# Patient Record
Sex: FEMALE | Race: WHITE | Employment: OTHER | URBAN - METROPOLITAN AREA
[De-identification: names, ages, dates, MRNs, and addresses within clinical notes are randomized per-mention and may not be internally consistent; named-entity substitution may affect disease eponyms.]

---

## 2018-08-06 ENCOUNTER — APPOINTMENT (OUTPATIENT)
Dept: MRI IMAGING | Age: 78
End: 2018-08-06
Attending: NURSE PRACTITIONER
Payer: MEDICARE

## 2018-08-06 ENCOUNTER — APPOINTMENT (OUTPATIENT)
Dept: GENERAL RADIOLOGY | Age: 78
End: 2018-08-06
Attending: NURSE PRACTITIONER
Payer: MEDICARE

## 2018-08-06 ENCOUNTER — HOSPITAL ENCOUNTER (OUTPATIENT)
Age: 78
Setting detail: OBSERVATION
Discharge: NURSING, REHAB OR LONG-TERM CARE | End: 2018-08-09
Attending: EMERGENCY MEDICINE | Admitting: HOSPITALIST
Payer: MEDICARE

## 2018-08-06 ENCOUNTER — APPOINTMENT (OUTPATIENT)
Dept: CT IMAGING | Age: 78
End: 2018-08-06
Attending: NURSE PRACTITIONER
Payer: MEDICARE

## 2018-08-06 DIAGNOSIS — G45.9 TRANSIENT CEREBRAL ISCHEMIA, UNSPECIFIED TYPE: Primary | ICD-10-CM

## 2018-08-06 DIAGNOSIS — L08.9 ANIMAL BITE OF RIGHT HAND WITH INFECTION, INITIAL ENCOUNTER: ICD-10-CM

## 2018-08-06 DIAGNOSIS — S61.451A ANIMAL BITE OF RIGHT HAND WITH INFECTION, INITIAL ENCOUNTER: ICD-10-CM

## 2018-08-06 DIAGNOSIS — R53.1 WEAKNESS: ICD-10-CM

## 2018-08-06 DIAGNOSIS — R19.7 DIARRHEA, UNSPECIFIED TYPE: ICD-10-CM

## 2018-08-06 PROBLEM — R47.9 SPEECH ABNORMALITY: Status: ACTIVE | Noted: 2018-08-06

## 2018-08-06 PROBLEM — R47.1 DYSARTHRIA: Status: ACTIVE | Noted: 2018-08-06
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
## 2018-08-06 PROBLEM — E11.9 DM (DIABETES MELLITUS) (HCC): Status: ACTIVE | Noted: 2018-08-06

## 2018-08-06 PROBLEM — E16.2 HYPOGLYCEMIA: Status: ACTIVE | Noted: 2018-08-06

## 2018-08-06 LAB
AMORPH CRY URNS QL MICRO: ABNORMAL
ANION GAP SERPL CALC-SCNC: 9 MMOL/L (ref 3–18)
APPEARANCE UR: CLEAR
BACTERIA URNS QL MICRO: ABNORMAL /HPF
BASOPHILS # BLD: 0 K/UL (ref 0–0.1)
BASOPHILS NFR BLD: 0 % (ref 0–2)
BILIRUB UR QL: NEGATIVE
BUN SERPL-MCNC: 11 MG/DL (ref 7–18)
BUN/CREAT SERPL: 13 (ref 12–20)
CALCIUM SERPL-MCNC: 8.8 MG/DL (ref 8.5–10.1)
CHLORIDE SERPL-SCNC: 101 MMOL/L (ref 100–108)
CHOLEST SERPL-MCNC: 115 MG/DL
CK MB CFR SERPL CALC: 1.4 % (ref 0–4)
CK MB SERPL-MCNC: 3.8 NG/ML (ref 5–25)
CK SERPL-CCNC: 275 U/L (ref 26–192)
CO2 SERPL-SCNC: 30 MMOL/L (ref 21–32)
COLOR UR: YELLOW
CREAT SERPL-MCNC: 0.88 MG/DL (ref 0.6–1.3)
DIFFERENTIAL METHOD BLD: ABNORMAL
EOSINOPHIL # BLD: 0 K/UL (ref 0–0.4)
EOSINOPHIL NFR BLD: 1 % (ref 0–5)
EPITH CASTS URNS QL MICRO: ABNORMAL /LPF (ref 0–5)
ERYTHROCYTE [DISTWIDTH] IN BLOOD BY AUTOMATED COUNT: 13.3 % (ref 11.6–14.5)
ERYTHROCYTE [SEDIMENTATION RATE] IN BLOOD: 29 MM/HR (ref 0–30)
EST. AVERAGE GLUCOSE BLD GHB EST-MCNC: 160 MG/DL
GLUCOSE BLD STRIP.AUTO-MCNC: 119 MG/DL (ref 70–110)
GLUCOSE BLD STRIP.AUTO-MCNC: 133 MG/DL (ref 70–110)
GLUCOSE BLD STRIP.AUTO-MCNC: 146 MG/DL (ref 70–110)
GLUCOSE BLD STRIP.AUTO-MCNC: 56 MG/DL (ref 70–110)
GLUCOSE BLD STRIP.AUTO-MCNC: 90 MG/DL (ref 70–110)
GLUCOSE SERPL-MCNC: 34 MG/DL (ref 74–99)
GLUCOSE UR STRIP.AUTO-MCNC: 500 MG/DL
HBA1C MFR BLD: 7.2 % (ref 4.2–5.6)
HCT VFR BLD AUTO: 38.6 % (ref 35–45)
HDLC SERPL-MCNC: 22 MG/DL (ref 40–60)
HDLC SERPL: 5.2 {RATIO} (ref 0–5)
HGB BLD-MCNC: 13.4 G/DL (ref 12–16)
HGB UR QL STRIP: ABNORMAL
KETONES UR QL STRIP.AUTO: NEGATIVE MG/DL
LDLC SERPL CALC-MCNC: 55.6 MG/DL (ref 0–100)
LEUKOCYTE ESTERASE UR QL STRIP.AUTO: NEGATIVE
LIPID PROFILE,FLP: ABNORMAL
LYMPHOCYTES # BLD: 0.2 K/UL (ref 0.9–3.6)
LYMPHOCYTES NFR BLD: 5 % (ref 21–52)
MCH RBC QN AUTO: 33.1 PG (ref 24–34)
MCHC RBC AUTO-ENTMCNC: 34.7 G/DL (ref 31–37)
MCV RBC AUTO: 95.3 FL (ref 74–97)
MONOCYTES # BLD: 0.2 K/UL (ref 0.05–1.2)
MONOCYTES NFR BLD: 5 % (ref 3–10)
NEUTS SEG # BLD: 4.2 K/UL (ref 1.8–8)
NEUTS SEG NFR BLD: 89 % (ref 40–73)
NITRITE UR QL STRIP.AUTO: NEGATIVE
PH UR STRIP: 6.5 [PH] (ref 5–8)
PLATELET # BLD AUTO: 248 K/UL (ref 135–420)
PMV BLD AUTO: 10.7 FL (ref 9.2–11.8)
POTASSIUM SERPL-SCNC: 3.2 MMOL/L (ref 3.5–5.5)
PROT UR STRIP-MCNC: 30 MG/DL
RBC # BLD AUTO: 4.05 M/UL (ref 4.2–5.3)
RBC #/AREA URNS HPF: ABNORMAL /HPF (ref 0–5)
SODIUM SERPL-SCNC: 140 MMOL/L (ref 136–145)
SP GR UR REFRACTOMETRY: 1.02 (ref 1–1.03)
T3FREE SERPL-MCNC: 1.9 PG/ML (ref 2.18–3.98)
T4 FREE SERPL-MCNC: 1.3 NG/DL (ref 0.7–1.5)
TRIGL SERPL-MCNC: 187 MG/DL (ref ?–150)
TROPONIN I SERPL-MCNC: 0.04 NG/ML (ref 0–0.04)
TSH SERPL DL<=0.05 MIU/L-ACNC: 0.87 UIU/ML (ref 0.36–3.74)
UROBILINOGEN UR QL STRIP.AUTO: 0.2 EU/DL (ref 0.2–1)
VLDLC SERPL CALC-MCNC: 37.4 MG/DL
WBC # BLD AUTO: 4.6 K/UL (ref 4.6–13.2)
WBC URNS QL MICRO: ABNORMAL /HPF (ref 0–4)

## 2018-08-06 PROCEDURE — 77030037870 HC GLD SHT PREVALON SAGE -B

## 2018-08-06 PROCEDURE — 99285 EMERGENCY DEPT VISIT HI MDM: CPT

## 2018-08-06 PROCEDURE — 96365 THER/PROPH/DIAG IV INF INIT: CPT

## 2018-08-06 PROCEDURE — 77030032490 HC SLV COMPR SCD KNE COVD -B

## 2018-08-06 PROCEDURE — 74011250637 HC RX REV CODE- 250/637: Performed by: NURSE PRACTITIONER

## 2018-08-06 PROCEDURE — 71045 X-RAY EXAM CHEST 1 VIEW: CPT

## 2018-08-06 PROCEDURE — 74011000258 HC RX REV CODE- 258: Performed by: EMERGENCY MEDICINE

## 2018-08-06 PROCEDURE — 85025 COMPLETE CBC W/AUTO DIFF WBC: CPT | Performed by: NURSE PRACTITIONER

## 2018-08-06 PROCEDURE — 65390000012 HC CONDITION CODE 44 OBSERVATION

## 2018-08-06 PROCEDURE — 85652 RBC SED RATE AUTOMATED: CPT | Performed by: NURSE PRACTITIONER

## 2018-08-06 PROCEDURE — 82550 ASSAY OF CK (CPK): CPT | Performed by: EMERGENCY MEDICINE

## 2018-08-06 PROCEDURE — 74011000258 HC RX REV CODE- 258: Performed by: NURSE PRACTITIONER

## 2018-08-06 PROCEDURE — 82962 GLUCOSE BLOOD TEST: CPT

## 2018-08-06 PROCEDURE — 96375 TX/PRO/DX INJ NEW DRUG ADDON: CPT

## 2018-08-06 PROCEDURE — 74011250637 HC RX REV CODE- 250/637: Performed by: EMERGENCY MEDICINE

## 2018-08-06 PROCEDURE — 80061 LIPID PANEL: CPT | Performed by: NURSE PRACTITIONER

## 2018-08-06 PROCEDURE — 84443 ASSAY THYROID STIM HORMONE: CPT | Performed by: NURSE PRACTITIONER

## 2018-08-06 PROCEDURE — 96366 THER/PROPH/DIAG IV INF ADDON: CPT

## 2018-08-06 PROCEDURE — 84481 FREE ASSAY (FT-3): CPT | Performed by: NURSE PRACTITIONER

## 2018-08-06 PROCEDURE — 84439 ASSAY OF FREE THYROXINE: CPT | Performed by: NURSE PRACTITIONER

## 2018-08-06 PROCEDURE — 87040 BLOOD CULTURE FOR BACTERIA: CPT | Performed by: EMERGENCY MEDICINE

## 2018-08-06 PROCEDURE — 74011250636 HC RX REV CODE- 250/636: Performed by: NURSE PRACTITIONER

## 2018-08-06 PROCEDURE — 77030037878 HC DRSG MEPILEX >48IN BORD MOLN -B

## 2018-08-06 PROCEDURE — 73130 X-RAY EXAM OF HAND: CPT

## 2018-08-06 PROCEDURE — 74011250636 HC RX REV CODE- 250/636: Performed by: EMERGENCY MEDICINE

## 2018-08-06 PROCEDURE — 94761 N-INVAS EAR/PLS OXIMETRY MLT: CPT

## 2018-08-06 PROCEDURE — 86141 C-REACTIVE PROTEIN HS: CPT | Performed by: NURSE PRACTITIONER

## 2018-08-06 PROCEDURE — 65270000029 HC RM PRIVATE

## 2018-08-06 PROCEDURE — 83036 HEMOGLOBIN GLYCOSYLATED A1C: CPT | Performed by: NURSE PRACTITIONER

## 2018-08-06 PROCEDURE — 80048 BASIC METABOLIC PNL TOTAL CA: CPT | Performed by: EMERGENCY MEDICINE

## 2018-08-06 PROCEDURE — 70450 CT HEAD/BRAIN W/O DYE: CPT

## 2018-08-06 PROCEDURE — 77030038269 HC DRN EXT URIN PURWCK BARD -A

## 2018-08-06 PROCEDURE — 74011000250 HC RX REV CODE- 250: Performed by: EMERGENCY MEDICINE

## 2018-08-06 PROCEDURE — 93005 ELECTROCARDIOGRAM TRACING: CPT

## 2018-08-06 PROCEDURE — 81001 URINALYSIS AUTO W/SCOPE: CPT | Performed by: NURSE PRACTITIONER

## 2018-08-06 PROCEDURE — 74011000250 HC RX REV CODE- 250: Performed by: NURSE PRACTITIONER

## 2018-08-06 RX ORDER — ALBUTEROL SULFATE 0.83 MG/ML
2.5 SOLUTION RESPIRATORY (INHALATION)
Status: DISCONTINUED | OUTPATIENT
Start: 2018-08-06 | End: 2018-08-09 | Stop reason: HOSPADM

## 2018-08-06 RX ORDER — MAGNESIUM SULFATE 100 %
4 CRYSTALS MISCELLANEOUS AS NEEDED
Status: DISCONTINUED | OUTPATIENT
Start: 2018-08-06 | End: 2018-08-09 | Stop reason: HOSPADM

## 2018-08-06 RX ORDER — AMOXICILLIN 250 MG
2 CAPSULE ORAL
Status: DISCONTINUED | OUTPATIENT
Start: 2018-08-06 | End: 2018-08-09 | Stop reason: HOSPADM

## 2018-08-06 RX ORDER — DEXTROSE 50 % IN WATER (D50W) INTRAVENOUS SYRINGE
25-50 AS NEEDED
Status: DISCONTINUED | OUTPATIENT
Start: 2018-08-06 | End: 2018-08-09 | Stop reason: HOSPADM

## 2018-08-06 RX ORDER — DEXTROSE 50 % IN WATER (D50W) INTRAVENOUS SYRINGE
25
Status: COMPLETED | OUTPATIENT
Start: 2018-08-06 | End: 2018-08-06

## 2018-08-06 RX ORDER — ATORVASTATIN CALCIUM 40 MG/1
80 TABLET, FILM COATED ORAL
Status: DISCONTINUED | OUTPATIENT
Start: 2018-08-06 | End: 2018-08-09 | Stop reason: HOSPADM

## 2018-08-06 RX ORDER — INSULIN LISPRO 100 [IU]/ML
INJECTION, SOLUTION INTRAVENOUS; SUBCUTANEOUS
Status: DISCONTINUED | OUTPATIENT
Start: 2018-08-06 | End: 2018-08-09 | Stop reason: HOSPADM

## 2018-08-06 RX ORDER — ACETAMINOPHEN 325 MG/1
650 TABLET ORAL
Status: DISCONTINUED | OUTPATIENT
Start: 2018-08-06 | End: 2018-08-09 | Stop reason: HOSPADM

## 2018-08-06 RX ORDER — ASPIRIN 325 MG
325 TABLET ORAL DAILY
Status: DISCONTINUED | OUTPATIENT
Start: 2018-08-07 | End: 2018-08-09 | Stop reason: HOSPADM

## 2018-08-06 RX ORDER — ASPIRIN 300 MG/1
300 SUPPOSITORY RECTAL
Status: COMPLETED | OUTPATIENT
Start: 2018-08-06 | End: 2018-08-06

## 2018-08-06 RX ORDER — ONDANSETRON 2 MG/ML
2 INJECTION INTRAMUSCULAR; INTRAVENOUS
Status: DISCONTINUED | OUTPATIENT
Start: 2018-08-06 | End: 2018-08-07

## 2018-08-06 RX ORDER — ACETAMINOPHEN 650 MG/1
650 SUPPOSITORY RECTAL
Status: DISCONTINUED | OUTPATIENT
Start: 2018-08-06 | End: 2018-08-09 | Stop reason: HOSPADM

## 2018-08-06 RX ADMIN — DEXTROSE MONOHYDRATE 25 G: 25 INJECTION, SOLUTION INTRAVENOUS at 11:42

## 2018-08-06 RX ADMIN — ASPIRIN 300 MG: 300 SUPPOSITORY RECTAL at 11:58

## 2018-08-06 RX ADMIN — PIPERACILLIN SODIUM,TAZOBACTAM SODIUM 4.5 G: 4; .5 INJECTION, POWDER, FOR SOLUTION INTRAVENOUS at 12:22

## 2018-08-06 RX ADMIN — PIPERACILLIN SODIUM,TAZOBACTAM SODIUM 4.5 G: 4; .5 INJECTION, POWDER, FOR SOLUTION INTRAVENOUS at 16:57

## 2018-08-06 RX ADMIN — FOLIC ACID: 5 INJECTION, SOLUTION INTRAMUSCULAR; INTRAVENOUS; SUBCUTANEOUS at 22:18

## 2018-08-06 RX ADMIN — ONDANSETRON 2 MG: 2 INJECTION, SOLUTION INTRAMUSCULAR; INTRAVENOUS at 20:23

## 2018-08-06 RX ADMIN — ATORVASTATIN CALCIUM 80 MG: 40 TABLET, FILM COATED ORAL at 16:57

## 2018-08-06 RX ADMIN — Medication 16 G: at 16:57

## 2018-08-06 NOTE — IP AVS SNAPSHOT
[facility], [place], operated by [facility] 
 
 
 [address] [address] [facility] Patient: Mathieu Brown MRN: [id number] REW:2[birth year]90[birth year]137 About your hospitalization You were admitted on:  August 6, 2018 You last received care in the:  [address] [place] [address] Drive 2E GEN SURG You were discharged on:  August 9, 2018 Why you were hospitalized Your primary diagnosis was:  Tia (Transient Ischemic Attack) Your diagnoses also included:  Speech Abnormality, Dysarthria, Dog Bite, Dm (Diabetes Mellitus) (Hcc), Hypoglycemia Follow-up Information Follow up With Details Comments Contact Info Citlalli Gaines MD In 1 week Recent hypoglycemia/CVA like symptoms [address] 83 [id number] [phone] Jennifer Summers MD In 1 week Regarding MRA neck showing moderate to severe carotid stenosis 655 [facility] 100 [address] [address] 
[phone] 8836 [address]   [address] [address] 
[phone] Discharge Orders None A check brody indicates which time of day the medication should be taken. My Medications START taking these medications Instructions Each Dose to Equal  
 Morning Noon Evening Bedtime  
 amoxicillin-clavulanate 1,000-62.5 mg per tablet Commonly known as:  AUGMENTIN Your last dose was: Your next dose is: Take 1 Tab by mouth every twelve (12) hours. Indications: dog bite 1 Tab CONTINUE taking these medications Instructions Each Dose to Equal  
 Morning Noon Evening Bedtime * albuterol 2.5 mg /3 mL (0.083 %) nebulizer solution Commonly known as:  PROVENTIL VENTOLIN Your last dose was: Your next dose is:    
   
   
 2.5 mg by Nebulization route every six (6) hours as needed for Wheezing. 2.5 mg  
    
   
   
   
  
 * albuterol 90 mcg/actuation inhaler Commonly known as:  PROVENTIL HFA, VENTOLIN HFA, PROAIR HFA Your last dose was: Your next dose is: Take 2 Puffs by inhalation every four (4) hours as needed for Wheezing. Indications: SOB 2 Puff  
    
   
   
   
  
 aspirin 325 mg tablet Commonly known as:  ASPIRIN Your last dose was: Your next dose is: Take 325 mg by mouth daily. Indications: MYOCARDIAL INFARCTION PREVENTION, PREVENTION OF TRANSIENT ISCHEMIC ATTACKS  
 325 mg  
    
   
   
   
  
 atorvastatin 20 mg tablet Commonly known as:  LIPITOR Your last dose was: Your next dose is: Take 20 mg by mouth daily. Indications: HYPERLIPIDEMIA, PREVENTION OF CEREBROVASCULAR ACCIDENT 20 mg  
    
   
   
   
  
 fluconazole 200 mg tablet Commonly known as:  DIFLUCAN Your last dose was: Your next dose is: Take 200 mg by mouth ACB/HS. 200 mg  
    
   
   
   
  
 gabapentin 300 mg capsule Commonly known as:  NEURONTIN Your last dose was: Your next dose is: Take 600 mg by mouth three (3) times daily. Indications: feet pain 600 mg  
    
   
   
   
  
 glimepiride 4 mg tablet Commonly known as:  AMARYL Your last dose was: Your next dose is: Take 4 mg by mouth ACB/HS. 4 mg  
    
   
   
   
  
 hydroCHLOROthiazide 25 mg tablet Commonly known as:  HYDRODIURIL Your last dose was: Your next dose is: Take 25 mg by mouth daily. Indications: EDEMA 25 mg HYDROcodone-acetaminophen 7.5-325 mg per tablet Commonly known as:  Augustus Sweeneyene Your last dose was: Your next dose is: Take 1 Tab by mouth every twelve (12) hours as needed for Pain. Indications: PAIN  
 1 Tab  
    
   
   
   
  
 ipratropium 0.02 % Soln Commonly known as:  ATROVENT Your last dose was: Your next dose is: 0.5 mg by Nebulization route every six (6) hours as needed for Wheezing. 0.5 mg  
    
   
   
   
  
 losartan 100 mg tablet Commonly known as:  COZAAR Your last dose was: Your next dose is: Take 100 mg by mouth daily. 100 mg  
    
   
   
   
  
 metFORMIN 500 mg tablet Commonly known as:  GLUCOPHAGE Your last dose was: Your next dose is: Take 500 mg by mouth two (2) times daily (with meals). Indications: TYPE 2 DIABETES MELLITUS  
 500 mg  
    
   
   
   
  
 traZODone 300 mg tablet Commonly known as:  Kyra Mcqueen Your last dose was: Your next dose is: Take 300 mg by mouth nightly. 300 mg * Notice: This list has 2 medication(s) that are the same as other medications prescribed for you. Read the directions carefully, and ask your doctor or other care provider to review them with you. Where to Get Your Medications Information on where to get these meds will be given to you by the nurse or doctor. ! Ask your nurse or doctor about these medications  
  amoxicillin-clavulanate 1,000-62.5 mg per tablet Opioid Education Prescription Opioids: What You Need to Know: 
 
 
 
F-face looks uneven A-arms unable to move or move unevenly S-speech slurred or non-existent T-time-call 911 as soon as signs and symptoms begin-DO NOT go Back to bed or wait to see if you get better-TIME IS BRAIN. Warning Signs of HEART ATTACK Call 911 if you have these symptoms: 
? Chest discomfort. Most heart attacks involve discomfort in the center of the chest that lasts more than a few minutes, or that goes away and comes back. It can feel like uncomfortable pressure, squeezing, fullness, or pain. ? Discomfort in other areas of the upper body. Symptoms can include pain or discomfort in one or both arms, the back, neck, jaw, or stomach. ? Shortness of breath with or without chest discomfort. ? Other signs may include breaking out in a cold sweat, nausea, or lightheadedness. Don't wait more than five minutes to call [address]! Fast action can save your life. Calling 911 is almost always the fastest way to get lifesaving treatment. Emergency Medical Services staff can begin treatment when they arrive  up to an hour sooner than if someone gets to the hospital by car. The discharge information has been reviewed with the patient. The patient verbalized understanding. Discharge medications reviewed with the patient and appropriate educational materials and side effects teaching were provided. ___________________________________________________________________________________________________________________________________ Transient Ischemic Attack: Care Instructions Your Care Instructions A transient ischemic attack (TIA) is when blood flow to a part of your brain is blocked for a short time. A TIA is like a stroke but usually lasts only a few minutes. A TIA does not cause lasting brain damage. Any vision problems, slurred speech, or other symptoms usually go away in 10 to 20 minutes. But they may last for up to 24 hours. TIAs are often warning signs of a stroke.  Some people who have a TIA may have a stroke in the future. A stroke can cause symptoms like those of a TIA. But a stroke causes lasting damage to your brain. You can take steps to help prevent a stroke. One thing you can do is get early treatment. If you have other new symptoms, or if your symptoms do not get better, go back to the emergency room or call your doctor right away. Getting treatment right away may prevent long-term brain damage caused by a stroke. The doctor has checked you carefully, but problems can develop later. If you notice any problems or new symptoms, get medical treatment right away. Follow-up care is a key part of your treatment and safety. Be sure to make and go to all appointments, and call your doctor if you are having problems. It's also a good idea to know your test results and keep a list of the medicines you take. How can you care for yourself at home? Medicines 
  · Be safe with medicines. Take your medicines exactly as prescribed. Call your doctor if you think you are having a problem with your medicine.  
  · If you take a blood thinner, such as aspirin, be sure you get instructions about how to take your medicine safely. Blood thinners can cause serious bleeding problems.  
  · Call your doctor if you are not able to take your medicines for any reason.  
  · Do not take any over-the-counter medicines or herbal products without talking to your doctor first.  
  · If you take birth control pills or hormone therapy, talk to your doctor. Ask if these treatments are right for you.  
 Lifestyle changes 
  · Do not smoke. If you need help quitting, talk to your doctor about stop-smoking programs and medicines.  
  · Be active. If your doctor recommends it, get more exercise. Walking is a good choice. Bit by bit, increase the amount you walk every day. Try for at least 30 minutes on most days of the week. You also may want to swim, bike, or do other activities.   · Eat heart-healthy foods. These include fruits, vegetables, high-fiber foods, fish, and foods that are low in sodium, saturated fat, and trans fat.  
  · Stay at a healthy weight. Lose weight if you need to.  
  · Limit alcohol to 2 drinks a day for men and 1 drink a day for women.  
 Staying healthy 
  · Manage other health problems such as diabetes, high blood pressure, and high cholesterol.  
  · Get the flu vaccine every year. When should you call for help? Call 911 anytime you think you may need emergency care. For example, call if: 
  · You have new or worse symptoms of a stroke. These may include: 
¨ Sudden numbness, tingling, weakness, or loss of movement in your face, arm, or leg, especially on only one side of your body. ¨ Sudden vision changes. ¨ Sudden trouble speaking. ¨ Sudden confusion or trouble understanding simple statements. ¨ Sudden problems with walking or balance. ¨ A sudden, severe headache that is different from past headaches. Call 911 even if these symptoms go away in a few minutes.  
  · You feel like you are having another TIA.  
 Watch closely for changes in your health, and be sure to contact your doctor if you have any problems. Where can you learn more? Go to http://abena-manju.info/. Enter ([phone] in the search box to learn more about \"Transient Ischemic Attack: Care Instructions. \" Current as of: November 21, 2017 Content Version: 11.7 © 6825-5117 [employer]. Care instructions adapted under license by [employer] (which disclaims liability or warranty for this information). If you have questions about a medical condition or this instruction, always ask your healthcare professional. [address] any warranty or liability for your use of this information. Patient discharged without removing armband and transfered to another healthcare acute, sub acute , or extended care facility.   Informed of privacy risks if armband lost or stolen [employer] Announcement We are excited to announce that we are making your provider's discharge notes available to you in [employer]. You will see these notes when they are completed and signed by the physician that discharged you from your recent hospital stay. If you have any questions or concerns about any information you see in [employer], please call the Health Information Department where you were seen or reach out to your Primary Care Provider for more information about your plan of care. Introducing [facility]! Jose Rafael Morris introduces [employer] patient portal. Now you can access parts of your medical record, email your doctor's office, and request medication refills online. 1. In your internet browser, go to [email or web address] 2. Click on the First Time User? Click Here link in the Sign In box. You will see the New Member Sign Up page. 3. Enter your [employer] Access Code exactly as it appears below. You will not need to use this code after youve completed the sign-up process. If you do not sign up before the expiration date, you must request a new code. · [employer] Access Code: [email or web address] Expires: 11/4/2018  8:31 AM 
 
4. Enter the last four digits of your Social Security Number (xxxx) and Date of Birth (mm/dd/yyyy) as indicated and click Submit. You will be taken to the next sign-up page. 5. Create a [employer] ID. This will be your [employer] login ID and cannot be changed, so think of one that is secure and easy to remember. 6. Create a [employer] password. You can change your password at any time. 7. Enter your Password Reset Question and Answer. This can be used at a later time if you forget your password. 8. Enter your e-mail address. You will receive e-mail notification when new information is available in [address] 9. Click Sign Up. You can now view and download portions of your medical record. 10. Click the Download Summary menu link to download a portable copy of your medical information. If you have questions, please visit the Frequently Asked Questions section of the [employer] website. Remember, [employer] is NOT to be used for urgent needs. For medical emergencies, dial 911. Now available from your iPhone and Android! Introducing Barry Mehta As a [facility] patient, I wanted to make you aware of our electronic visit tool called Barry Mehta. [employer]/[facility] allows you to connect within minutes with a medical provider 24 hours a day, seven days a week via a mobile device or tablet or logging into a secure website from your computer. You can access Barry Mehta from anywhere in the [place]. A virtual visit might be right for you when you have a simple condition and feel like you just dont want to get out of bed, or cant get away from work for an appointment, when your regular [facility] provider is not available (evenings, weekends or holidays), or when youre out of town and need minor care. Electronic visits cost only $49 and if the [employer]/[facility] provider determines a prescription is needed to treat your condition, one can be electronically transmitted to a nearby pharmacy*. Please take a moment to enroll today if you have not already done so. The enrollment process is free and takes just a few minutes. To enroll, please download the Overtone arden to your tablet or phone, or visit [email or web address] to enroll on your computer. And, as an [address] [place] [address] patient with a [employer] account, the results of your visits will be scanned into your electronic medical record and your primary care provider will be able to view the scanned results. We urge you to continue to see your regular [facility] provider for your ongoing medical care.   And while your primary care provider may not be the one available when you seek a Barry Penagayfin virtual visit, the peace of mind you get from getting a real diagnosis real time can be priceless. For more information on [employer], view our Frequently Asked Questions (FAQs) at [email or web address]. Sincerely, 
 
Francie Mercado MD 
Chief Medical Officer [employer] *:  certain medications cannot be prescribed via [employer] Unresulted Labs-Please follow up with your PCP about these lab tests Order Current Status CULTURE, BLOOD Preliminary result CULTURE, BLOOD Preliminary result Providers Seen During Your Hospitalization Provider Specialty Primary office phone Jeniffer Taylor MD Emergency Medicine [phone] Boone Montana MD Emergency Medicine [phone] Nubia Schmid MD Internal Medicine [phone] Jillian Nix DO Internal Medicine [phone] Your Primary Care Physician (PCP) Primary Care Physician Office Phone Office Fax Meagan John [phone] You are allergic to the following Allergen Reactions Metformin Unknown (comments) Recent Documentation Height Weight BMI OB Status Smoking Status 1.575 m 113.4 kg 45.73 kg/m2 Hysterectomy Current Every Day Smoker Emergency Contacts Name Discharge Info Relation Home Work Mobile Salas Grullon DISCHARGE CAREGIVER [3] Spouse [3] [phone] [address] CAREGIVER [3] Daughter [21] [phone] Patient Belongings The following personal items are in your possession at time of discharge: 
  Dental Appliances: None  Visual Aid: None      Home Medications: None   Jewelry: None  Clothing: None    Other Valuables: None Please provide this summary of care documentation to your next provider.  
  
  
 
  
Signatures-by signing, you are acknowledging that this After Visit Summary has been reviewed with you and you have received a copy. Patient Signature:  ____________________________________________________________ Date:  ____________________________________________________________  
  
Gwenyth Miles Provider Signature:  ____________________________________________________________ Date:  ____________________________________________________________

## 2018-08-06 NOTE — ED NOTES
12:30 PM   Reevaluated patient and her mental status is normal.    Critical Care Time:  The services I provided to this patient were to treat and/or prevent clinically significant deterioration that could result in the failure of one or more body systems and/or organ systems due to hypoglycemia. Services included the following:  -reviewing nursing notes and old charts  -vital sign assessments  -direct patient care  -medication orders and management  -interpreting and reviewing diagnostic studies/labs  -re-evaluations  -documentation time  -administering d-50    Aggregate critical care time was 32 minutes, which includes only time during which I was engaged in work directly related to the patient's care as described above, whether I was at bedside or elsewhere in the Emergency Department. It did not include time spent performing other reported procedures or the services of residents, students, nurses, or advance practice providers. Chelsea Pettit MD    12:25 PM      Consult:  Discussed care with Dr. Bassem Braswell, Specialty: Hospitalist  Standard discussion; including history of patients chief complaint, available diagnostic results, and treatment course. Will see and admit patient. 11:30 AM, 8/6/2018     11:15 AM  Patient was given aspirin. Patient is not septic. Vital sign abnormalities due to dehydration, not infection. 11:00AM  Reevaluation: Patient is neurologically at baseline and in no acute distress. Will call hospitalist to have her admitted. 9:09 AM :Pt care assumed from Corby Simons , ED provider. Pt complaint(s), current treatment plan, progression and available diagnostic results have been discussed thoroughly. Rounding occurred: yes  Intended Disposition: TBD   Pending diagnostic reports and/or labs (please list): CT result, Neuro consult.     Scribe Attestation     Martha Perez acting as a scribe for and in the presence of Chelsea Pettit MD      August 06, 2018 at 9:08 AM       Provider Attestation:      I personally performed the services described in the documentation, reviewed the documentation, as recorded by the scribe in my presence, and it accurately and completely records my words and actions.  August 06, 2018 at 9:08 Jet Weiss MD

## 2018-08-06 NOTE — ED NOTES
TRANSFER - ED to INPATIENT REPORT:    SBAR report made available to receiving floor on this patient being transferred to 2400  for routine progression of care       Admitting diagnosis Speech abnormality    Information from the following report(s) SBAR, ED Summary, Recent Results and Med Rec Status was made available to receiving floor. Lines:   Peripheral IV 08/06/18 Left Hand (Active)   Site Assessment Clean, dry, & intact 8/6/2018  9:54 AM   Phlebitis Assessment 0 8/6/2018  9:54 AM   Infiltration Assessment 0 8/6/2018  9:54 AM   Dressing Status Clean, dry, & intact 8/6/2018  9:54 AM   Dressing Type Transparent 8/6/2018  9:54 AM   Hub Color/Line Status Pink;Flushed;Patent 8/6/2018  9:54 AM   Action Taken Blood drawn 8/6/2018  9:54 AM        Medication list unable to confirm    Opportunity for questions and clarification was provided.       Patient is only aware of  place, person and situation Last [facility] 1402  Patient is  incontinent and non-ambulatory     Valuables transported with patient     Patient transported with:   Monitor  Registered Nurse

## 2018-08-06 NOTE — PROGRESS NOTES
Problem: Falls - Risk of  Goal: *Absence of Falls  Document Efrain Fall Risk and appropriate interventions in the flowsheet.   Fall Risk Interventions:

## 2018-08-06 NOTE — ED PROVIDER NOTES
HPI Comments: Yuval Moreno is a 70year old female who presents to the ED after being brought in by [facility] for low blood sugar and a dog bite on the right hand. Pt states she is a diabetic and is complaint with her medication. Was bitten by a dog recently and has been placed on ABX by her PCP. During the interview, the  states the Pt has had a change in her speech pattern, similar to that which happened several years ago when she sustained a stroke. This speech change has occurred in the past 24 hours, but  states it varies. Sometimes it's normal and at other times it is abnormal.      Patient is a 70 y.o. female presenting with hypoglycemia and animal bite. The history is provided by the patient and the spouse. History limited by: No communication barrier. Low Blood Sugar    This is a new problem. The current episode started 12 to 24 hours ago. The problem has not changed since onset. Mental status baseline is normal.  Risk factors: age. Animal Bite           No past medical history on file. No past surgical history on file. No family history on file. Social History     Social History    Marital status: SINGLE     Spouse name: N/A    Number of children: N/A    Years of education: N/A     Occupational History    Not on file. Social History Main Topics    Smoking status: Not on file    Smokeless tobacco: Not on file    Alcohol use Not on file    Drug use: Not on file    Sexual activity: Not on file     Other Topics Concern    Not on file     Social History Narrative         ALLERGIES: Metformin    Review of Systems   Constitutional: Negative. HENT: Negative. Eyes: Negative. Respiratory: Negative. Cardiovascular: Negative. Gastrointestinal: Negative. Endocrine: Negative. Genitourinary: Negative. Musculoskeletal: Negative. Skin: Positive for wound (dog bite right hand). Allergic/Immunologic: Negative. Neurological: Negative. Hematological: Negative. Psychiatric/Behavioral: Negative. Vitals:    08/06/18 0833   BP: 174/88   Pulse: (!) 106   Resp: 26   Temp: 98.2 °F (36.8 °C)   SpO2: 97%   Weight: 108.9 kg (240 lb)   Height: 5' 6\" (1.676 m)            Physical Exam   Constitutional: She is oriented to person, place, and time. She appears well-developed and well-nourished. No distress. HENT:   Head: Normocephalic and atraumatic. Eyes: EOM are normal. Pupils are equal, round, and reactive to light. Neck: Normal range of motion. Neck supple. Cardiovascular: Normal rate, regular rhythm and normal heart sounds. Pulmonary/Chest: No respiratory distress. She has no wheezes. She has no rales. Abdominal: Soft. Bowel sounds are normal. There is no tenderness. There is no rebound. Genitourinary:   Genitourinary Comments: NE   Musculoskeletal: Normal range of motion. Neurological: She is alert and oriented to person, place, and time. No cranial nerve deficit. She exhibits abnormal muscle tone. Coordination normal.   Skin: Skin is warm and dry. Dorsal right hand is erythematous and old healing bite wound evident. Psychiatric: She has a normal mood and affect. Nursing note and vitals reviewed. MDM  Number of Diagnoses or Management Options  Diagnosis management comments: PROGRESS NOTE:  Care of the Pt has been turned over to MAIKOL Gates at this time.   Oumou Block NP  9:15 AM            ED Course       Procedures

## 2018-08-06 NOTE — PROGRESS NOTES
Pt arrived to the floor from ED via stretcher to room accompanied by Brayan Rios RN. Pt is A&Ox3, denies pain. No distress noted. Pt oriented to room. Call bell and frequently used items in reach with bed locked in lowest position. Pt arrived with excoriation in groin and dog bite to right hand. 1624 Pt passed dysphagia screen. Pt had a episode of hypoglycemia. Oral glucose tabs were given and oral intake was encouraged. Pt was frequently encouraged to drink fluids. 1711 Pt blood glucose minal to 90 and pt was then given dinner. 1714  Orders received for pt to be on a regular diet. RBV. This nurse attempted to complete admission docs but provide invalid information. 1920 Bedside and Verbal shift change report given to Mary Perry RN (oncoming nurse) by Dali Dove RN (offgoing nurse). Report included the following information SBAR, Kardex, Intake/Output, MAR and Recent Results. Dual NIH performed.

## 2018-08-06 NOTE — H&P
GENERAL GENERIC H&P/CONSULT    Abdullahi Marroquin is a 70year old female with hx of DM, HTN, previous CVA who presents to the ED after being brought in by [facility] for hypoglycemia with dysarthria and a dog bite on the right hand. Patient was found to have BS of 34 and subsequently was given D5W with improvement in BS. Patient was recently placed on abx after dog bite by PCP. Patient apparently over the past day was some dysartheria different from her baseline. Tele neuro was consulted and recommeneded admission for TIA r/o. She notes some discomfort from dog bite to right hand. Patient is neurologically back to baseline per . She deneis any sob, cp, fever, chills, n/v, or any other acute complaint at this time. No past medical history on file. No past surgical history on file. Prior to Admission medications    Medication Sig Start Date End Date Taking? Authorizing Provider   losartan (COZAAR) 100 mg tablet Take 100 mg by mouth daily. Historical Provider   aspirin (ASPIRIN) 325 mg tablet Take 325 mg by mouth daily. Indications: MYOCARDIAL INFARCTION PREVENTION, PREVENTION OF TRANSIENT ISCHEMIC ATTACKS    Historical Provider   atorvastatin (LIPITOR) 20 mg tablet Take 20 mg by mouth daily. Indications: HYPERLIPIDEMIA, PREVENTION OF CEREBROVASCULAR ACCIDENT    Historical Provider   hydrochlorothiazide (HYDRODIURIL) 25 mg tablet Take 25 mg by mouth daily. Indications: EDEMA    Historical Provider   HYDROcodone-acetaminophen (NORCO) 7.5-325 mg per tablet Take 1 Tab by mouth every twelve (12) hours as needed for Pain. Indications: PAIN    Historical Provider   metFORMIN (GLUCOPHAGE) 500 mg tablet Take 500 mg by mouth two (2) times daily (with meals). Indications: TYPE 2 DIABETES MELLITUS    Historical Provider   ipratropium (ATROVENT) 0.02 % nebulizer solution 0.5 mg by Nebulization route every six (6) hours as needed for Wheezing.     Historical Provider   albuterol (PROVENTIL VENTOLIN) 2.5 mg /3 mL (0.083 %) nebulizer solution 2.5 mg by Nebulization route every six (6) hours as needed for Wheezing. Historical Provider   gabapentin (NEURONTIN) 300 mg capsule Take 600 mg by mouth three (3) times daily. Indications: feet pain    Historical Provider   fluconazole (DIFLUCAN) 200 mg tablet Take 200 mg by mouth ACB/HS. Historical Provider   albuterol (PROVENTIL HFA, VENTOLIN HFA, PROAIR HFA) 90 mcg/actuation inhaler Take 2 Puffs by inhalation every four (4) hours as needed for Wheezing. Indications: SOB    Historical Provider   glimepiride (AMARYL) 4 mg tablet Take 4 mg by mouth ACB/HS. Historical Provider   traZODone (DESYREL) 300 mg tablet Take 300 mg by mouth nightly. Historical Provider     Allergies   Allergen Reactions    Metformin Unknown (comments)      Social History   Substance Use Topics    Smoking status: Not on file    Smokeless tobacco: Not on file    Alcohol use Not on file      No family history on file. Review of Systems   Constitutional: Negative. HENT: Negative. Eyes: Negative. Respiratory: Negative. Cardiovascular: Negative. Gastrointestinal: Negative. Genitourinary: Negative. Neurological: Negative. Objective:          Patient Vitals for the past 8 hrs:   BP Temp Pulse Resp SpO2 Height Weight   08/06/18 1100 176/84 - (!) 108 - 95 % - -   08/06/18 0845 172/84 - (!) 105 23 96 % - -   08/06/18 0833 174/88 98.2 °F (36.8 °C) (!) 106 26 97 % 5' 6\" (1.676 m) 108.9 kg (240 lb)   08/06/18 0830 177/73 - (!) 106 25 97 % - -     Physical Exam   Constitutional: No distress. HENT:   Head: Normocephalic. Eyes: Pupils are equal, round, and reactive to light. Neck: Normal range of motion. Cardiovascular: Normal rate. Pulmonary/Chest: Effort normal and breath sounds normal.   Abdominal: Soft. Bowel sounds are normal.   Neurological:   Speech difficulty   Skin: She is not diaphoretic.    Redness to right hand        Labs:    Recent Results (from the past 24 hour(s))   GLUCOSE, POC    Collection Time: 08/06/18  8:18 AM   Result Value Ref Range    Glucose (POC) 133 (H) 70 - 110 mg/dL   URINALYSIS W/ RFLX MICROSCOPIC    Collection Time: 08/06/18  9:00 AM   Result Value Ref Range    Color YELLOW      Appearance CLEAR      Specific gravity 1.019 1.005 - 1.030      pH (UA) 6.5 5.0 - 8.0      Protein 30 (A) NEG mg/dL    Glucose 500 (A) NEG mg/dL    Ketone NEGATIVE  NEG mg/dL    Bilirubin NEGATIVE  NEG      Blood TRACE (A) NEG      Urobilinogen 0.2 0.2 - 1.0 EU/dL    Nitrites NEGATIVE  NEG      Leukocyte Esterase NEGATIVE  NEG     URINE MICROSCOPIC ONLY    Collection Time: 08/06/18  9:00 AM   Result Value Ref Range    WBC 0 to 3 0 - 4 /hpf    RBC 11 to 20 0 - 5 /hpf    Epithelial cells 1+ 0 - 5 /lpf    Bacteria 1+ (A) NEG /hpf    Amorphous Crystals 1+ (A) NEG   CBC WITH AUTOMATED DIFF    Collection Time: 08/06/18  9:35 AM   Result Value Ref Range    WBC 4.6 4.6 - 13.2 K/uL    RBC 4.05 (L) 4.20 - 5.30 M/uL    HGB 13.4 12.0 - 16.0 g/dL    HCT 38.6 35.0 - 45.0 %    MCV 95.3 74.0 - 97.0 FL    MCH 33.1 24.0 - 34.0 PG    MCHC 34.7 31.0 - 37.0 g/dL    RDW 13.3 11.6 - 14.5 %    PLATELET 667 332 - 120 K/uL    MPV 10.7 9.2 - 11.8 FL    NEUTROPHILS 89 (H) 40 - 73 %    LYMPHOCYTES 5 (L) 21 - 52 %    MONOCYTES 5 3 - 10 %    EOSINOPHILS 1 0 - 5 %    BASOPHILS 0 0 - 2 %    ABS. NEUTROPHILS 4.2 1.8 - 8.0 K/UL    ABS. LYMPHOCYTES 0.2 (L) 0.9 - 3.6 K/UL    ABS. MONOCYTES 0.2 0.05 - 1.2 K/UL    ABS. EOSINOPHILS 0.0 0.0 - 0.4 K/UL    ABS.  BASOPHILS 0.0 0.0 - 0.1 K/UL    DF AUTOMATED     METABOLIC PANEL, BASIC    Collection Time: 08/06/18 10:36 AM   Result Value Ref Range    Sodium 140 136 - 145 mmol/L    Potassium 3.2 (L) 3.5 - 5.5 mmol/L    Chloride 101 100 - 108 mmol/L    CO2 30 21 - 32 mmol/L    Anion gap 9 3.0 - 18 mmol/L    Glucose 34 (LL) 74 - 99 mg/dL    BUN 11 7.0 - 18 MG/DL    Creatinine 0.88 0.6 - 1.3 MG/DL    BUN/Creatinine ratio 13 12 - 20      GFR est AA >60 >60 ml/min/1.73m2    GFR est non-AA >60 >60 ml/min/1.73m2    Calcium 8.8 8.5 - 10.1 MG/DL   CARDIAC PANEL,(CK, CKMB & TROPONIN)    Collection Time: 08/06/18 10:36 AM   Result Value Ref Range     (H) 26 - 192 U/L    CK - MB 3.8 (H) <3.6 ng/ml    CK-MB Index 1.4 0.0 - 4.0 %    Troponin-I, Qt. 0.04 0.0 - 0.045 NG/ML   EKG, 12 LEAD, INITIAL    Collection Time: 08/06/18 10:48 AM   Result Value Ref Range    Ventricular Rate 100 BPM    Atrial Rate 100 BPM    P-R Interval 170 ms    QRS Duration 86 ms    Q-T Interval 332 ms    QTC Calculation (Bezet) 428 ms    Calculated P Axis 48 degrees    Calculated R Axis -49 degrees    Calculated T Axis 70 degrees    Diagnosis       Sinus rhythm with occasional premature ventricular complexes  Low voltage QRS  Left anterior fascicular block  Abnormal ECG  When compared with ECG of 25-MAY-2016 15:52,  premature ventricular complexes are now present  Left anterior fascicular block is now present           Assessment:  Principal Problem:    TIA (transient ischemic attack) (8/6/2018)    Active Problems:    Speech abnormality (8/6/2018)      Dysarthria (8/6/2018)      Dog bite (8/6/2018)      DM (diabetes mellitus) ([facility] [address] (8/6/2018)      Hypoglycemia (8/6/2018)        Plan:    TIA with dyasrthria  -resolved  -I suspect this is more likely 2/2 hypoglycemic episodes however given hx of CVA will need to r/o as recommended by Tele neuro  -per staff patient is essentially back at baseline as they have taken care of her before.   -asa, statin  -CT head negative  -neuro consult   -MRI/MRA, head neck  -ECHO  -neuro checks per protocol    DM II with hypoglycemia  -monitor  -tele  -D5 prn  -may need D5 gtt low dose if no goes back down    Dog Bite  -x-ray negative for osteo  -abx for now will do broader with zosyn as started in ED given DM  -if no improvement and component of cellulitis will add vanc   -HD stable  -afebrile  No leukcoytosis  -will do single agent for now    DVT prophaylxis  -scd/teds  -heparin sq    Signed:  Brittany Fox NP 8/6/2018

## 2018-08-06 NOTE — ED TRIAGE NOTES
Per EMS, \"her  called for lethargy x 2 days. Her blood sugar on our arrival was 25mg/dL. We gave an amp of D50, which brought her sugar up to the 150's. She has a history of a stroke so she is coming back to baseline. Her  did say that she was bit by their dog on her right hand, the area is noted red and inflamed. \"    Pt arrived covered in dried feces. Pt cleansed.

## 2018-08-06 NOTE — IP AVS SNAPSHOT
[address] Patient: Amada Zeng MRN: [id number] RRQ:[id number] A check brody indicates which time of day the medication should be taken. My Medications START taking these medications Instructions Each Dose to Equal  
 Morning Noon Evening Bedtime  
 amoxicillin-clavulanate 1,000-62.5 mg per tablet Commonly known as:  AUGMENTIN Your last dose was: Your next dose is: Take 1 Tab by mouth every twelve (12) hours. Indications: dog bite 1 Tab CONTINUE taking these medications Instructions Each Dose to Equal  
 Morning Noon Evening Bedtime * albuterol 2.5 mg /3 mL (0.083 %) nebulizer solution Commonly known as:  PROVENTIL VENTOLIN Your last dose was: Your next dose is:    
   
   
 2.5 mg by Nebulization route every six (6) hours as needed for Wheezing. 2.5 mg  
    
   
   
   
  
 * albuterol 90 mcg/actuation inhaler Commonly known as:  PROVENTIL HFA, VENTOLIN HFA, PROAIR HFA Your last dose was: Your next dose is: Take 2 Puffs by inhalation every four (4) hours as needed for Wheezing. Indications: SOB 2 Puff  
    
   
   
   
  
 aspirin 325 mg tablet Commonly known as:  ASPIRIN Your last dose was: Your next dose is: Take 325 mg by mouth daily. Indications: MYOCARDIAL INFARCTION PREVENTION, PREVENTION OF TRANSIENT ISCHEMIC ATTACKS  
 325 mg  
    
   
   
   
  
 atorvastatin 20 mg tablet Commonly known as:  LIPITOR Your last dose was: Your next dose is: Take 20 mg by mouth daily. Indications: HYPERLIPIDEMIA, PREVENTION OF CEREBROVASCULAR ACCIDENT 20 mg  
    
   
   
   
  
 fluconazole 200 mg tablet Commonly known as:  DIFLUCAN Your last dose was: Your next dose is: Take 200 mg by mouth ACB/HS. 200 mg  
    
   
   
   
  
 gabapentin 300 mg capsule Commonly known as:  NEURONTIN Your last dose was: Your next dose is: Take 600 mg by mouth three (3) times daily. Indications: feet pain 600 mg  
    
   
   
   
  
 glimepiride 4 mg tablet Commonly known as:  AMARYL Your last dose was: Your next dose is: Take 4 mg by mouth ACB/HS. 4 mg  
    
   
   
   
  
 hydroCHLOROthiazide 25 mg tablet Commonly known as:  HYDRODIURIL Your last dose was: Your next dose is: Take 25 mg by mouth daily. Indications: EDEMA 25 mg HYDROcodone-acetaminophen 7.5-325 mg per tablet Commonly known as:  Ashley Mura Your last dose was: Your next dose is: Take 1 Tab by mouth every twelve (12) hours as needed for Pain. Indications: PAIN  
 1 Tab  
    
   
   
   
  
 ipratropium 0.02 % Soln Commonly known as:  ATROVENT Your last dose was: Your next dose is: 0.5 mg by Nebulization route every six (6) hours as needed for Wheezing. 0.5 mg  
    
   
   
   
  
 losartan 100 mg tablet Commonly known as:  COZAAR Your last dose was: Your next dose is: Take 100 mg by mouth daily. 100 mg  
    
   
   
   
  
 metFORMIN 500 mg tablet Commonly known as:  GLUCOPHAGE Your last dose was: Your next dose is: Take 500 mg by mouth two (2) times daily (with meals). Indications: TYPE 2 DIABETES MELLITUS  
 500 mg  
    
   
   
   
  
 traZODone 300 mg tablet Commonly known as:  Urban Loera Your last dose was: Your next dose is: Take 300 mg by mouth nightly. 300 mg * Notice: This list has 2 medication(s) that are the same as other medications prescribed for you. Read the directions carefully, and ask your doctor or other care provider to review them with you. Where to Get Your Medications Information on where to get these meds will be given to you by the nurse or doctor. ! Ask your nurse or doctor about these medications  
  amoxicillin-clavulanate 1,000-62.5 mg per tablet

## 2018-08-07 ENCOUNTER — APPOINTMENT (OUTPATIENT)
Dept: MRI IMAGING | Age: 78
End: 2018-08-07
Attending: NURSE PRACTITIONER
Payer: MEDICARE

## 2018-08-07 ENCOUNTER — APPOINTMENT (OUTPATIENT)
Dept: NON INVASIVE DIAGNOSTICS | Age: 78
End: 2018-08-07
Attending: NURSE PRACTITIONER
Payer: MEDICARE

## 2018-08-07 LAB
ATRIAL RATE: 100 BPM
CALCULATED P AXIS, ECG09: 48 DEGREES
CALCULATED R AXIS, ECG10: -49 DEGREES
CALCULATED T AXIS, ECG11: 70 DEGREES
CRP SERPL HS-MCNC: 73.86 MG/L (ref 0–3)
DIAGNOSIS, 93000: NORMAL
GLUCOSE BLD STRIP.AUTO-MCNC: 155 MG/DL (ref 70–110)
GLUCOSE BLD STRIP.AUTO-MCNC: 161 MG/DL (ref 70–110)
GLUCOSE BLD STRIP.AUTO-MCNC: 228 MG/DL (ref 70–110)
GLUCOSE BLD STRIP.AUTO-MCNC: 287 MG/DL (ref 70–110)
P-R INTERVAL, ECG05: 170 MS
Q-T INTERVAL, ECG07: 332 MS
QRS DURATION, ECG06: 86 MS
QTC CALCULATION (BEZET), ECG08: 428 MS
VENTRICULAR RATE, ECG03: 100 BPM

## 2018-08-07 PROCEDURE — 65390000012 HC CONDITION CODE 44 OBSERVATION

## 2018-08-07 PROCEDURE — 99218 HC RM OBSERVATION: CPT

## 2018-08-07 PROCEDURE — 97162 PT EVAL MOD COMPLEX 30 MIN: CPT

## 2018-08-07 PROCEDURE — 96376 TX/PRO/DX INJ SAME DRUG ADON: CPT

## 2018-08-07 PROCEDURE — 74011000258 HC RX REV CODE- 258: Performed by: NURSE PRACTITIONER

## 2018-08-07 PROCEDURE — 74011250637 HC RX REV CODE- 250/637: Performed by: NURSE PRACTITIONER

## 2018-08-07 PROCEDURE — 77030038269 HC DRN EXT URIN PURWCK BARD -A

## 2018-08-07 PROCEDURE — G8979 MOBILITY GOAL STATUS: HCPCS

## 2018-08-07 PROCEDURE — 70553 MRI BRAIN STEM W/O & W/DYE: CPT

## 2018-08-07 PROCEDURE — 92610 EVALUATE SWALLOWING FUNCTION: CPT

## 2018-08-07 PROCEDURE — 70544 MR ANGIOGRAPHY HEAD W/O DYE: CPT

## 2018-08-07 PROCEDURE — 82962 GLUCOSE BLOOD TEST: CPT

## 2018-08-07 PROCEDURE — 97530 THERAPEUTIC ACTIVITIES: CPT

## 2018-08-07 PROCEDURE — G8978 MOBILITY CURRENT STATUS: HCPCS

## 2018-08-07 PROCEDURE — 97165 OT EVAL LOW COMPLEX 30 MIN: CPT

## 2018-08-07 PROCEDURE — A9575 INJ GADOTERATE MEGLUMI 0.1ML: HCPCS | Performed by: HOSPITALIST

## 2018-08-07 PROCEDURE — G8999 MOTOR SPEECH CURRENT STATUS: HCPCS

## 2018-08-07 PROCEDURE — 77030037870 HC GLD SHT PREVALON SAGE -B

## 2018-08-07 PROCEDURE — 74011250636 HC RX REV CODE- 250/636: Performed by: NURSE PRACTITIONER

## 2018-08-07 PROCEDURE — 77030037878 HC DRSG MEPILEX >48IN BORD MOLN -B

## 2018-08-07 PROCEDURE — 74011000250 HC RX REV CODE- 250: Performed by: NURSE PRACTITIONER

## 2018-08-07 PROCEDURE — 92523 SPEECH SOUND LANG COMPREHEN: CPT

## 2018-08-07 PROCEDURE — 77030021566 MRA NECK W WO CONT

## 2018-08-07 PROCEDURE — 74011250636 HC RX REV CODE- 250/636: Performed by: HOSPITALIST

## 2018-08-07 PROCEDURE — G9186 MOTOR SPEECH GOAL STATUS: HCPCS

## 2018-08-07 PROCEDURE — 96366 THER/PROPH/DIAG IV INF ADDON: CPT

## 2018-08-07 PROCEDURE — 74011636637 HC RX REV CODE- 636/637: Performed by: NURSE PRACTITIONER

## 2018-08-07 RX ORDER — GADOTERATE MEGLUMINE 376.9 MG/ML
20 INJECTION INTRAVENOUS
Status: COMPLETED | OUTPATIENT
Start: 2018-08-07 | End: 2018-08-07

## 2018-08-07 RX ORDER — ONDANSETRON 2 MG/ML
4 INJECTION INTRAMUSCULAR; INTRAVENOUS
Status: DISCONTINUED | OUTPATIENT
Start: 2018-08-07 | End: 2018-08-09 | Stop reason: HOSPADM

## 2018-08-07 RX ORDER — AMOXICILLIN AND CLAVULANATE POTASSIUM 562.5; 437.5; 62.5 MG/1; MG/1; MG/1
1 TABLET, FILM COATED, EXTENDED RELEASE ORAL EVERY 12 HOURS
Status: DISCONTINUED | OUTPATIENT
Start: 2018-08-07 | End: 2018-08-09 | Stop reason: HOSPADM

## 2018-08-07 RX ADMIN — ASPIRIN 325 MG ORAL TABLET 325 MG: 325 PILL ORAL at 08:35

## 2018-08-07 RX ADMIN — PIPERACILLIN SODIUM,TAZOBACTAM SODIUM 4.5 G: 4; .5 INJECTION, POWDER, FOR SOLUTION INTRAVENOUS at 08:35

## 2018-08-07 RX ADMIN — INSULIN LISPRO 2 UNITS: 100 INJECTION, SOLUTION INTRAVENOUS; SUBCUTANEOUS at 18:36

## 2018-08-07 RX ADMIN — PIPERACILLIN SODIUM,TAZOBACTAM SODIUM 4.5 G: 4; .5 INJECTION, POWDER, FOR SOLUTION INTRAVENOUS at 00:57

## 2018-08-07 RX ADMIN — INSULIN LISPRO 6 UNITS: 100 INJECTION, SOLUTION INTRAVENOUS; SUBCUTANEOUS at 12:21

## 2018-08-07 RX ADMIN — INSULIN LISPRO 2 UNITS: 100 INJECTION, SOLUTION INTRAVENOUS; SUBCUTANEOUS at 08:35

## 2018-08-07 RX ADMIN — AMOXICILLIN AND CLAVULANATE POTASSIUM 1 TABLET: 562.5; 437.5; 62.5 TABLET, MULTILAYER, EXTENDED RELEASE ORAL at 13:11

## 2018-08-07 RX ADMIN — FOLIC ACID: 5 INJECTION, SOLUTION INTRAMUSCULAR; INTRAVENOUS; SUBCUTANEOUS at 16:18

## 2018-08-07 RX ADMIN — AMOXICILLIN AND CLAVULANATE POTASSIUM 1 TABLET: 562.5; 437.5; 62.5 TABLET, MULTILAYER, EXTENDED RELEASE ORAL at 23:07

## 2018-08-07 RX ADMIN — ONDANSETRON 4 MG: 2 INJECTION INTRAMUSCULAR; INTRAVENOUS at 23:07

## 2018-08-07 RX ADMIN — ATORVASTATIN CALCIUM 80 MG: 40 TABLET, FILM COATED ORAL at 23:07

## 2018-08-07 RX ADMIN — GADOTERATE MEGLUMINE 20 ML: 376.9 INJECTION INTRAVENOUS at 21:55

## 2018-08-07 RX ADMIN — ONDANSETRON 2 MG: 2 INJECTION, SOLUTION INTRAMUSCULAR; INTRAVENOUS at 11:15

## 2018-08-07 RX ADMIN — ONDANSETRON 2 MG: 2 INJECTION, SOLUTION INTRAMUSCULAR; INTRAVENOUS at 03:23

## 2018-08-07 RX ADMIN — ONDANSETRON 4 MG: 2 INJECTION INTRAMUSCULAR; INTRAVENOUS at 16:18

## 2018-08-07 NOTE — PROGRESS NOTES
conducted an initial consultation and Spiritual Assessment for Jaswinder Magdaleno, who is a 70 y.o.,female. Patients Primary Language is: [place]. According to the patients EMR Yazidi Affiliation is: Amber Omalley. The reason the Patient came to the hospital is:   Patient Active Problem List    Diagnosis Date Noted    Speech abnormality 08/06/2018    Dysarthria 08/06/2018    Dog bite 08/06/2018    DM (diabetes mellitus) ([facility] [address] 08/06/2018    Hypoglycemia 08/06/2018    TIA (transient ischemic attack) 08/06/2018    Sepsis ([facility] [address] 05/25/2016    Encephalopathy 05/25/2016        The  provided the following Interventions:  Initiated a relationship of care and support. Explored issues of tim, spirituality and/or Sikhism needs while hospitalized. Listened empathically. Provided chaplaincy education. Provided information about Spiritual Care Services. Offered prayer and assurance of continued prayers on patient's behalf. Chart reviewed. The following outcomes were achieved:  Patient shared some information about their medical narrative and spiritual journey/beliefs. Patient processed feeling about current hospitalization. Patient expressed gratitude for the 's visit. Assessment:  Patient did not indicate any spiritual or Sikhism issues which require Spiritual Care Services interventions at this time. Patient does not have any Sikhism/cultural needs that will affect patients preferences in health care. Plan:  Chaplains will continue to follow and will provide pastoral care on an as needed or requested basis.  recommends bedside caregivers page  on duty if patient shows signs of acute spiritual or emotional distress.     [address] [facility]   Staff [address] [facility]   [phone]

## 2018-08-07 NOTE — PROGRESS NOTES
Assumed care of pt from [facility]. Alert and oriented x 3. Report included SBAR, MAR, kardex. Frequent use items within reach. Bed locked in lowest position. Call bell within reach. Pt verbalizes understanding to call for assistance. 1930 MRI called. 2000 Started new IV and cleaned patient,had a bowel movement and urinated. 2030 To MRI. Mri called to say it is too late to do test. 2030 Medicated for nausea. 2100 Patient nauseated,medicated. Incontinent urine and stool,bathed and cleaned. 0200 Incontinent,cleaned. 0323 medicated for nausea. 0500Incontinent,cleaned. 0720 Bedside shift change report given to [facility] (oncoming nurse) by Stepan Fritz RN (offgoing nurse). Report included the following information SBAR, Kardex and MAR.

## 2018-08-07 NOTE — PROGRESS NOTES
Problem: Mobility Impaired (Adult and Pediatric)  Goal: *Acute Goals and Plan of Care (Insert Text)  Physical Therapy Goals  Initiated 8/7/2018 and to be accomplished within 7 day(s)  1. Patient will move from supine to sit and sit to supine  in bed with modified independence. 2.  Patient will transfer from bed to chair and chair to bed with modified independence using the least restrictive device. 3.  Patient will perform sit to stand with modified independence. 4.  Patient will ambulate with modified independence for 150 feet with the least restrictive device. Outcome: Progressing Towards Goal  PHYSICAL THERAPY: Initial Assessment   INPATIENT: Medicare: Hospital Day: 2     Patient: Kenrick Ricardo (40 y.o. female)    Date: 8/7/2018  Primary Diagnosis: Speech abnormality   Precautions: Fall, Skin  PLOF: Amb with cane    ASSESSMENT :  Patient requires between minimal assistance/contact guard assist and supervision/set-up for bed mobility, transfers and ambulation. Oriented to self and place. Disoriented to time even with choices. Inconsistent with social history/prior level of function information. Reports amb with ww d/t her \"legs don't work right\". Lives with  in apartment with elevator access. Min A for supine to sit. Good sitting balance at EOB; 5 minutes. Requires verbal and tactile cues for manual muscle testing. Agreeable to standing with max encouragement. Min A for sit to stand. Cues for safety with hand placement and ww. Maintained standing 1 minute. Returned to seated at EOB. Completed lateral scoot x2 with encouragement. Min A for sit to supine. Discharge rec to home with supervision and AD. If unable to provide supervision may need skilled nursing facility. Patient presents with deficits in:  Bed Mobility, Transfers, Gait, Strength and Balance    Patient will benefit from skilled intervention to address the above impairments.   Patients rehabilitation potential is considered to be Fair  Factors which may influence rehabilitation potential include:   []         None noted  [x]         Mental ability/status  [x]         Medical condition  []         Home/family situation and support systems  []         Safety awareness  []         Pain tolerance/management  []         Other:      PLAN :  Recommendations and Planned Interventions:  [x]           Bed Mobility Training             [x]    Neuromuscular Re-Education  [x]           Transfer Training                   []    Orthotic/Prosthetic Training  [x]           Gait Training                          []    Modalities  [x]           Therapeutic Exercises          []    Edema Management/Control  [x]           Therapeutic Activities            [x]    Patient and Family Training/Education  []           Other (comment):      EDUCATION:   Education:  Patient was educated on the following topics: Bed mobility, transfers, ADLs, balance, amb, safety, exercise, role of PT, plan of care, cognition, skin integrity, vitals      Barriers to Learning/Limitations: yes;  altered mental status (i.e.Sedation, Confusion)  Compensate with: visual, verbal, tactile, kinesthetic cues/model  Frequency/Duration: Patient will be followed by physical therapy 3-5 times a week to address goals. Discharge Recommendations: Home Health  Further Equipment Recommendations for Discharge: rolling walker      SUBJECTIVE:   Patient stated My legs don't work right.     OBJECTIVE DATA SUMMARY:     Past Medical History:   Diagnosis Date    Cancer ([facility] [address]     Chronic obstructive pulmonary disease ([facility] [address]     Diabetes ([facility] [address]     Hypertension     Stroke [facility])      Past Surgical History:   Procedure Laterality Date    BREAST SURGERY PROCEDURE UNLISTED      HX GYN       Eval Complexity: History: MEDIUM  Complexity : 1-2 comorbidities / personal factors will impact the outcome/ POC Exam:MEDIUM Complexity : 3 Standardized tests and measures addressing body structure, function, activity limitation and / or participation in recreation  Presentation: MEDIUM Complexity : Evolving with changing characteristics  Clinical Decision Making:Medium Complexity [facility] Standing Balance Scale 2/5 Overall Complexity:MEDIUM    G CODES:Mobility [id number] Current  CL= 60-79%   Goal  CI= 1-19%. The severity rating is based on the Other [facility] Balance Scale 2/5    [address] Standing Balance Scale 2/5  0: Pt performs 25% or less of standing activity (Max assist) CN, 100% impaired. 1: Pt supports self with upper extremities but requires therapist assistance. Pt performs 25-50% of effort (Mod assist) CM, 80% to <100% impaired. 1+: Pt supports self with upper extremities but requires therapist assistance. Pt performs >50% effort. (Min assist). CL, 60% to <80% impaired. 2: Pt supports self independently with both upper extremities (walker, crutches, parallel bars). CL, 60% to <80% impaired. 2+: Pt support self independently with 1 upper extremity (cane, crutch, 1 parallel bar). CK, 40% to <60% impaired. 3: Pt stands without upper extremity support for up to 30 seconds. CK, 40% to <60% impaired. 3+: Pt stands without upper extremity support for 30 seconds or greater. CJ, 20% to <40% impaired. 4: Pt independently moves and returns center of gravity 1-2 inches in one plane. CJ, 20% to <40% impaired. 4+: Pt independently moves and returns center of gravity 1-2 inches in multiple planes. CI, 1% to <20% impaired. 5: Pt independently moves and returns center of gravity in all planes greater than 2 inches. CH, 0% impaired.     Prior Level of Function/Home Situation: Amb with walker  Home Situation  Home Environment: Apartment  # Steps to Enter: 0  One/Two Story Residence: One story  Living Alone: No  Support Systems: Spouse/Significant Other/Partner  Patient Expects to be Discharged to[de-identified] Apartment  Current DME Used/Available at Home: Walker, rolling  Critical Behavior:  Neurologic State: Alert  Orientation Level: Oriented to person;Oriented to place; Disoriented to time  Cognition: Follows commands  Safety/Judgement: Decreased insight into deficits  Psychosocial  Patient Behaviors: Cooperative    Manual Muscle Testing (LE)         R     L    Hip Flexion:   4/5 4/5  Knee EXT:   4/5 4/5  Knee FLEX:   4/5 4/5  Ankle DF:   4/5 4/5  _________________________________________________   Range Of Motion: BLE decreased AROM, functional    Functional Mobility:      Functional Status      Indep   (I)   Mod I   Super-vision   Min A   Mod A   Max A   Total A   Assist x2 Verbal cues Additional time Not tested   Comments   Rolling []  []  [] []    []    []  []  [] [] [] [x]    Supine to sit []  []  [] [x]  []  []  []  [] [] [] []    Sit to supine []  []  [] [x]  []  []  []  [] [] [] []    Sit to stand []  []  [] [x]  []  []  []  [] [] [] []    Stand to sit []  []  [] [x]  []  []  []  [] [] [] []    Bed to chair transfers []  []  [] []  []  []  []  [] [] [] [x]        Balance    Good   Fair   Poor   Unable   Not tested   Comments   Sitting static []  [x]  []  []  []    Sitting dynamic []  [x]  []  []  []    Standing static []  [x]  []  []  []    Standing dynamic []  [x]  []  []  []        Mobility/Gait: Not tested    Pain:  Pre treatment pain level: 0  Post treatment pain level: 0  Pain Scale 1: Numeric (0 - 10)  Pain Intensity 1: 0    Activity Tolerance:   Fair  Please refer to the flowsheet for vital signs taken during this treatment. After treatment:   [x]         Patient left in no apparent distress sitting up in chair  []         Patient left in no apparent distress in bed  [x]         Call bell left within reach  [x]         Nursing notified  []         Caregiver present  []         Bed alarm activated    COMMUNICATION/EDUCATION:   [x]         Fall prevention education was provided and the patient/caregiver indicated understanding.   [x]         Patient/family have participated as able in goal setting and plan of care. [x]         Patient/family agree to work toward stated goals and plan of care. []         Patient understands intent and goals of therapy, but is neutral about his/her participation. []         Patient is unable to participate in goal setting and plan of care.     Thank you for this referral.  Ricardo Nickerson, PT   Time Calculation: 21 mins

## 2018-08-07 NOTE — PROGRESS NOTES
Met with the pt to discuss home care services. She requested her  be called to discuss home care and offer FOC. Call to Barbara Rosa # [phone]. Informed him the pt has given verbal consent for [facility],  but requested for him to be notified. Mr. Jenny Ovalles is agreeable for [facility] to provide the pt with home care services. Pt verified the contact information on the face sheet is correct. FOC on the chart. DC pending. Care Management Interventions  PCP Verified by CM: Yes  Palliative Care Criteria Met (RRAT>21 & CHF Dx)?: No  Mode of Transport at Discharge:  Other (see comment)  Transition of Care Consult (CM Consult): [address]: Yes  Discharge Durable Medical Equipment: No  Physical Therapy Consult: Yes (Skilled Nurse)  Occupational Therapy Consult: Yes  Speech Therapy Consult: No  Current Support Network: Lives with Spouse, Own Home  Confirm Follow Up Transport: Family  Plan discussed with Pt/Family/Caregiver: Yes  Freedom of Choice Offered: Yes  Discharge Location  Discharge Placement: Home with home health

## 2018-08-07 NOTE — PROGRESS NOTES
Problem: Motor Speech Impaired (Adult)  Goal: *Acute Goals and Plan of Care (Insert Text)  Recommendations:  Diet: regular/thin  Meds: per pt preference  Aspiration Precautions     Patient will:  1. Participate in training and education related to continued aspiration risk, diet recs and compensatory strategies (goal met). 2. Utilize compensatory strategies (decrease rate, overarticulate, increase intensity) to increase intelligibility to 90% at conversation level with min visual/verbal cues  3. Complete articulatory agility tasks (reading-conversation) with min visual/verbal cues  4. Demonstrate functional increase in articulation skills for effective participation in communication ADLs with , MAGGIE Speech LAnguage Pathology bedside swallow   and speech evaluation    Patient: Raúl Aly (49 y.o. female)  Date: 8/7/2018  Primary Diagnosis: Speech abnormality          Precautions:   Fall, Skin    PLOF: lives with spouse who is primary caregiver; regular diet    ASSESSMENT :  Based on the objective data described below, the patient presents with intact oropharyngeal swallow function. A&Ox2. Oral-motor exam revealed structures grossly intact for mastication and deglutition. 0 aspiration evident across all eval trials, to include: thin liquid + straw; pudding; and cracker. Demo: (+) bolus cohesion, manipulation, and propulsion; (+) swallow reflex; and (+) hyolaryngeal excursion. 0 oropharyngeal dysphagia evident at this time. Pt is safe for regular solid/thin liquid diet. No formal ST needs for dysphagia. Speech eval results -- Based on the objective data described below, the patient presents with moderate dysarthria in the setting of stroke. Noted mild oral apraxia of speech with phonemic and semantic paraphasias. Pt with fluent speech c/b imprecise articulation, blended word boundaries, and decreased breath support. Pt with intact basic auditory comprehension. Naming and repetition intact.  Pt benefits from multimodal cues for increasing intensity, over-articulation, and decreased speech rate. SLP will follow for motor speech deficit. Patient will benefit from skilled intervention to address the above impairments. Patients rehabilitation potential is considered to be Fair  Factors which may influence rehabilitation potential include:   []            None noted  [x]            Mental ability/status  [x]            Medical condition  []            Home/family situation and support systems  []            Safety awareness  []            Pain tolerance/management  []            Other:      PLAN :  Recommendations and Planned Interventions: Motor speech tx  Frequency/Duration: Patient will be followed by speech-language pathology 1-2 times per day/4-7 days per week to address goals. Discharge Recommendations: Skilled Nursing Facility     SUBJECTIVE:   Patient stated Ashleigh Crump.     OBJECTIVE:     Past Medical History:   Diagnosis Date    [facility] [address]     Chronic obstructive pulmonary disease ([facility] [address] [facility]     Diabetes ([facility] [address] [facility]     Hypertension     Stroke [facility])      Past Surgical History:   Procedure Laterality Date    BREAST SURGERY PROCEDURE UNLISTED      HX GYN       Prior Level of Function/Home Situation: lives with spouse  Home Situation  Home Environment: Apartment  # Steps to Enter: 0  One/Two Story Residence: One story  Living Alone: No  Support Systems: Spouse/Significant Other/Partner  Patient Expects to be Discharged to[de-identified] Apartment  Current DME Used/Available at Home: Walker, rolling  Diet prior to admission: regular/thin  Current Diet:  Regular/thin   Cognitive and Communication Status:  Neurologic State: Alert  Orientation Level: Oriented to person, Oriented to place, Disoriented to time  Cognition: Follows commands  Perception: Appears intact  Perseveration: No perseveration noted  Safety/Judgement: Decreased insight into deficits  Oral Assessment:  Oral Assessment  Labial: No impairment  Dentition: Natural  Oral Hygiene: Good  Lingual: Decreased rate;Decreased strength  Velum: No impairment  Mandible: No impairment  P.O. Trials:  Patient Position: HOB 60  Vocal quality prior to P.O.: Low volume  Consistency Presented: Thin liquid;Puree; Solid  How Presented: SLP-fed/presented;Straw;Successive swallows     Bolus Acceptance: No impairment  Bolus Formation/Control: No impairment     Propulsion: No impairment  Oral Residue: None  Initiation of Swallow: No impairment  Laryngeal Elevation: Functional  Aspiration Signs/Symptoms: None  Pharyngeal Phase Characteristics: No impairment, issues, or problems   Effective Modifications: Small sips and bites  Cues for Modifications: Minimal       Oral Phase Severity: No impairment  Pharyngeal Phase Severity : No impairment    Motor Speech:  Oral-Motor Structure/Motor Speech  Labial: No impairment  Dentition: Natural  Oral Hygiene: Good  Lingual: Decreased rate;Decreased strength  Velum: No impairment  Mandible: No impairment  Language Comprehension and Expression:     Verbal Expression  Primary Mode of Expression: Verbal  Initiation: No impairment  Automatic Speech Task: No impairment  Repetition: No impairment  Naming: No impairment  Sentence Completion: No impairment  Conversation: Dysarthric;Apraxic  Speech Characteristics: Paraphasias  Interfering Components: Impaired thought organization; Environmental distractions  Effective Techniques: Provide extra time  Overall Impairment: Mild        Neuro-Linguistics:   See above      GCODEMotor:  Current  CJ= 20-39%   Goal  CI= 1-19%    The severity rating is based on the following outcomes:    National Outcomes Measures (NOMS) - UJLIETTE Noms Swallow Level 5  Professional Judgement       PAIN:  Start of Eval: 0  End of Eval: 0     After treatment:   []            Patient left in no apparent distress sitting up in chair  [x]            Patient left in no apparent distress in bed  [x]            Call bell left within reach  [x] Nursing notified  []            Family present  []            Caregiver present  []            Bed alarm activated    COMMUNICATION/EDUCATION:   [x]            Aspiration precautions; swallow safety; compensatory techniques. [x]            Expressive/Receptive compensatory speech techniques  [x]            Patient/family have participated as able in goal setting and plan of care. []            Patient/family agree to work toward stated goals and plan of care. []            Patient understands intent and goals of therapy; neutral about participation. []            Patient unable to participate in goal setting/plan of care; educ ongoing with interdisciplinary staff  []         Posted safety precautions in patient's room.     Thank you for this referral.  Octaviano Hernandez, SLP  Time Calculation: 20 mins   Speech Eval - 10 mins  Swallow Eval - 10 mins

## 2018-08-07 NOTE — PROGRESS NOTES
Problem: Falls - Risk of  Goal: *Absence of Falls  Document Efrain Fall Risk and appropriate interventions in the flowsheet. Outcome: Progressing Towards Goal  Fall Risk Interventions:       Mentation Interventions: Adequate sleep, hydration, pain control, Door open when patient unattended, More frequent rounding    Medication Interventions: Evaluate medications/consider consulting pharmacy    Elimination Interventions: Call light in reach, Patient to call for help with toileting needs, Toileting schedule/hourly rounds    History of Falls Interventions: Door open when patient unattended        Problem: Pressure Injury - Risk of  Goal: *Prevention of pressure injury  Document Ganga Scale and appropriate interventions in the flowsheet.    Outcome: Progressing Towards Goal  Pressure Injury Interventions:  Sensory Interventions: Pressure redistribution bed/mattress (bed type), Assess changes in LOC    Moisture Interventions: Absorbent underpads, Apply protective barrier, creams and emollients, Check for incontinence Q2 hours and as needed, Internal/External urinary devices, Maintain skin hydration (lotion/cream)    Activity Interventions: Pressure redistribution bed/mattress(bed type)    Mobility Interventions: Pressure redistribution bed/mattress (bed type)    Nutrition Interventions: Document food/fluid/supplement intake    Friction and Shear Interventions: Apply protective barrier, creams and emollients, Foam dressings/transparent film/skin sealants

## 2018-08-07 NOTE — PROGRESS NOTES
Reason for Admission:   R/o cva                  RRAT Score:    18              Do you (patient/family) have any concerns for transition/discharge? She is c/o nausea and does not feel ready to dc home              Plan for utilizing home health:   yes      Likelihood of readmission? Mod yellow            Transition of Care Plan:   Spoke with pt, lives with spouse. Designates him for dcp. Independent with adls. amb with  A walker. No other dme in home. husb to transport home. Notified Dr Susan Rosen pt c/o nausea. pcp Dr Keagan Vallecillo, last seen 1 mo ago. Demographics correct. Plan home with hh .       Patient has designated ______________spouse__________ to participate in his/her discharge plan and to receive any needed information.      Name: Lorri Lesch  Address:  Phone number:[phone]

## 2018-08-07 NOTE — PROGRESS NOTES
Patient has been changed from inpatient to observation and a Condition 44 has been completed based on Medicare criteria. The patient / or next of kin will be made aware of this change in status and given a copy of  the [facility] Outpatient Observation Information and Notification letter.

## 2018-08-07 NOTE — PROGRESS NOTES
Problem: Self Care Deficits Care Plan (Adult)  Goal: *Acute Goals and Plan of Care (Insert Text)  Occupational Therapy Goals  Initiated 8/7/2018 within 7 day(s). 1.  Patient will perform grooming tasks while standing with supervision for safety. 2.  Patient will perform lower body dressing with minimal assistance utilizing adaptive strategies, prn.  3.  Patient will perform functional task in standing for 8 minutes with supervision for balance and minimal verbal cues for safety. 4.  Patient will perform toilet transfers with supervision/set-up. 5.  Patient will perform all aspects of toileting with supervision/set-up. 6.  Patient will participate in upper extremity therapeutic exercise/activities with supervision/set-up for 8 minutes to increase BUE strength for functional transfers & ADLs. 7.  Patient will utilize energy conservation techniques during functional activities with minimal verbal cues. Outcome: Progressing Towards Goal  [facility]  Occupational THERAPY: Initial Assessment   INPATIENT: Medicare: Hospital Day: 2     Patient: Amada Zeng (55 y.o. female)    Date: 8/7/2018  Primary Diagnosis: Speech abnormality    ,  ,   Precautions: Falls  PLOF: Pt reports she required assistance with all ADLs PTA and utilized RW for functional mobility. ASSESSMENT:   Based on the objective data described below, the patient presents with impairments with regard to bed mobility and independence in ADLs. Pt supine on arrival, agreeable to therapy. Reports she required assistance for ADLs PTA; also utilized RW for functional mobility. Pt demo'ing impaired speech; reports it is baseline. Slow to respond to therapist's questions at times. SBA/additional time for supine-->sit. Pt tolerated sitting EOB ~5 mins prior to requesting to return supine secondary to dizziness/nausea. Pt deferred standing/chair transfer/BSC transfer at this time.  Returned supine, needs within reach, provided with basin d/t adis Henderson RN aware of session. Recommend continued therapy to maximize independence in functional transfers & [place] in ADLs. Recommendations for the next treatment session: C transfer; ADLs at [facility]  Ms. Cami Gale will benefit from skilled intervention to address the above impairments. Her rehabilitation potential is considered to be Good. EDUCATION     Education:  Patient was educated on the following topics: importance of mobility   Barriers to Learning/Limitations: None  Compensate with: visual, verbal, tactile, kinesthetic cues/model     PLAN OF CARE:  Problems:  Decreased ROM, Decreased strength affecting function, Decreased ADL/functioning of activities and Decreased transfer abilities    Recommendations and Planned Interventions:  [x]                  Self Care Training                   [x]         Therapeutic Activities  [x]                  Functional Mobility Training    []          Cognitive Retraining  [x]                  Therapeutic Exercises            [x]          Endurance Activities  [x]                  Balance Training                     []          Neuromuscular Re-ed   []                  Visual/Perceptual Training      [x]       Home Safety Training  [x]                  Patient Education                    [x]          Family Training/Education  []                  Other (comment):    Frequency/Duration: Patient will be followed by occupational therapy 3-5 times a week to address goals. Discharge Recommendations: Home Health     Further Equipment Recommendations for Discharge: anticipate noneSUBJECTIVE:  Patient stated: \"I might be sick. \"    OBJECTIVE/TREATMENT:     Past Medical History:   Diagnosis Date    Cancer ([facility] [address]     Chronic obstructive pulmonary disease ([facility] [address] [facility]     Diabetes ([facility] [address] [facility]     Hypertension     Stroke [facility])      Past Surgical History:   Procedure Laterality Date    BREAST SURGERY PROCEDURE UNLISTED      HX GYN        Eval Complexity: History: LOW Complexity : Brief history review ; Examination: LOW Complexity : 1-3 performance deficits relating to physical, cognitive , or psychosocial skils that result in activity limitations and / or participation restrictions ; Decision Making:LOW Complexity : No comorbidities that affect functional and no verbal or physical assistance needed to complete eval tasks     G CODES: Self Care  Current  CJ= 20-39%   Goal  CI= 1-19%. The severity rating is based on the Other Functional Assessment, MMT, ROM    Prior Level of Function/Home Situation:   Ambulatory and capable of self-care but unable to carry out any work activities. Up and about more than 50% of waking hours.    Lives with Spouse  1 story  Arlyne Footman chair, Grab bars    Cognitive/Behavioral Status:   Neurologic State: alert   Orientation: oriented to time, place, person and situation  Cognition:   appropriate decision making, appropriate for age attention/concentration, appropriate safety awareness and following commands  follows two step commands/direction   Safety/Judgement: Awareness of environment and Fall prevention    ROM: within normal limits (BUEs)  MMT: 4/5 (BUEs)  Coordination: WFL (BUEs)  Hand dominance:Right    Skin: Intact (BUEs)  Edema: None noted (BUEs)  Sensation: Intact (BUEs)    Vision/Perceptual: normal      Functional Status      Indep   Mod I   Sup. /  Set- Up    SBA   CGA   Min Assist   Mod Assist   Max assist   Total Assist   Assist x2    Additional Time   NT   Comments   Rolling []  []  []  [x]  []    []    []    []  []  []  []  []     Supine to sit []  []  []  [x]  []  []  []  []  []  []  [x]  []     Sit to supine []  []  []  [x]  []  []  []  []  []  []  []  []     Sit to stand []  []  []  []  []  []  []  []  []  []  []  [x]  Pt deferred d/t nausea   Toilet Transfer []  []  []  []  []  []  []  []  []  []  []  [x]                     Feeding []  []  [x]  []  []  []  []  []  []  []  []  []     Grooming []  []  [x]  []  []  []  [] []  []  []  []  []     Bathing  []  []  []  []  []  [x]  []  []  []  []  []  []     UB Dressing  []  []  [x]  []  []  []  []  []  []  []  []  []     LB Dressing  []  []  []  []  []  []  [x]  []  []  []  []  []     Toileting []  []  []  []  []  []  []  [x]  []  []  []  []  periwick       Balance    Good   Fair   Poor   Unable   Comments   Sitting static [x]  []  []  []     Sitting dynamic []  [x]  []  []  Fair+   Standing static []  []  []  []  N/t due to pt nausea   Standing dynamic []  []  []  []       Pain:   Pre treatment: 0/10  Post treatment: 0/10  Scale: numeric    Activity tolerance:  fair and fair-    COMMUNICATION/EDUCATION: Pt educated on role of OT and POC; she verbalized understanding. [x]         Fall prevention education was provided and the patient/caregiver indicated understanding. [x]         Patient/family have participated as able in goal setting and plan of care. [x]         Patient/family agree to work toward stated goals and plan of care. []         Patient understands intent and goals of therapy, but is neutral about his/her participation     The patients plan of care was also discussed with: Physical Therapist, Certified Occupational Therapy Assistant and Registered Nurse.      · After treatment position/precautions:   o Supine in bed  o Bed in low position  o Call light within reach  o RN notified     Recommendations for nursing: up with assist x1    Thank you for this referral.  Morteza Pearson MS OTR/L  Time Calculation: 13 mins

## 2018-08-07 NOTE — DIABETES MGMT
Diabetes Patient/Family Education Record    Factors That  May Influence Patients Ability  to Learn or  Comply with Recommendations   []   Language barrier    []   Cultural needs   []   Motivation    []   Cognitive limitation    []   Physical   []   Education    []   Physiological factors   []   Hearing/vision/speaking impairment   []   Gnosticism beliefs    []   Financial factors   []  Other:   []  No factors identified at this time. Person Instructed:   [x]   Patient   []   Family   []  Other     Preference for Learning:   [x]   Verbal   []   Written   []  Demonstration     Level of Comprehension & Competence:    []  Good                                      [x] Fair                                     []  Poor                             [x]  Needs Reinforcement   []  Teachback completed    Education Component:   [x]  Medication management, including how to administer insulin (if appropriate) and potential medication interactions states she has had diabetes for about 20 years and takes pills for her diabetes. []  Nutritional management    []  Exercise   [x]  Signs, symptoms, and treatment of hyperglycemia and hypoglycemia reviewed signs and symptoms of hypoglycemia with patient. She states this is the first time she remembers this happening. When reviewing treatment for hypoglycemia, pt stated she wasn't aware of how to treat it. Was able to repeat back appropriate treatment sources. [x] Prevention, recognition and treatment of hyperglycemia and hypoglycemia   [x]  Importance of blood glucose monitoring and how to obtain a blood glucose meter states she doesn't currently have a glucometer, but knows how to use it. Will need prescription for glucometer and supplies at discharge. []  Instruction on use of the blood glucose meter   [x]  Discuss the importance of HbA1C monitoring 7.2% equivalent to an average blood glucose of 160 mg/dl over the past 2-3 months.    [x]  Sick day guidelines   []  Proper use and disposal of lancets, needles, syringes or insulin pens (if appropriate)   []  Potential long-term complications (retinopathy, kidney disease, neuropathy, foot care)   [x] Information about whom to contact in case of emergency or for more information    [x]  Goal:  Patient/family will demonstrate understanding of Diabetes Self Management Skills by: (date) ___8/12/18____  Plan for post-discharge education or self-management support:    [] Outpatient class schedule provided            [] Patient Declined    [] Scheduled for outpatient classes (date) _______     Isabel Saucedo RN CDE  [facility] [phone]  Ext [phone]

## 2018-08-07 NOTE — PROGRESS NOTES
Medical Progress Note      NAME: Nisreen Duvall   :  1940  MRM:  [id number]    Date/Time: 2018  11:46 AM         Subjective:     Nisreen Duvall is a 70year old female with hx of DM, HTN, previous CVA who presents to the ED after being brought in by [facility] for hypoglycemia with dysarthria and a dog bite on the right hand. Patient was found to have BS of 34 and subsequently was given D5W with improvement in BS. Patient was recently placed on abx after dog bite by PCP. Patient apparently over the past day was some dysartheria different from her baseline. Tele neuro was consulted and recommeneded admission for TIA r/o. She notes some discomfort from dog bite to right hand. Patient is neurologically back to baseline per . She deneis any sob, cp, fever, chills, n/v, or any other acute complaint at this time    \"im ok. I want sleeping pills now. \"    Past Medical History reviewed and unchanged from Admission History and Physical    Review of Systems   Constitutional: Negative. Eyes: Negative. Respiratory: Negative. Cardiovascular: Negative. Gastrointestinal: Negative. Genitourinary: Negative. Musculoskeletal: Negative. Neurological: Negative. Hematological: Negative. Objective:     Looks better compared to yesterday. ECHO pending. MRI/MRA results still pending. As stated yesterday disucssion with ED noted patient at baseline who are familiar with her. Suspected 2/2 hypoglycemia. Hand looks slightly improved. When discharged can probably be placed on augmentin. I have called MRI to see if they can expedite the read. Vitals:      Last 24hrs VS reviewed since prior progress note.  Most recent are:    Visit Vitals    /84 (BP 1 Location: Right arm, BP Patient Position: At rest;Supine)    Pulse (!) 107    Temp 98.4 °F (36.9 °C)    Resp 16    Ht 5' 6\" (1.676 m)    Wt 108.9 kg (240 lb)    SpO2 96%    BMI 38.74 kg/m2     SpO2 Readings from Last 6 Encounters: 08/07/18 96%   05/30/16 93%          Intake/Output Summary (Last 24 hours) at 08/07/18 1146  Last data filed at 08/07/18 0951   Gross per 24 hour   Intake              340 ml   Output                0 ml   Net              340 ml          Exam:      Physical Exam   Constitutional: No distress. HENT:   Head: Normocephalic. Eyes: Pupils are equal, round, and reactive to light. Neck: Normal range of motion. No JVD present. Cardiovascular: Normal rate. Pulmonary/Chest: Effort normal. No stridor. Abdominal: Soft. Bowel sounds are normal.   Neurological: She is alert. Skin: She is not diaphoretic.    Redness to right hand         Medications:  Current Facility-Administered Medications   Medication Dose Route Frequency    ondansetron (ZOFRAN) injection 2 mg  2 mg IntraVENous Q6H PRN    aspirin (ASPIRIN) tablet 325 mg  325 mg Oral DAILY    atorvastatin (LIPITOR) tablet 80 mg  80 mg Oral QHS    acetaminophen (TYLENOL) tablet 650 mg  650 mg Oral Q4H PRN    acetaminophen (TYLENOL) suppository 650 mg  650 mg Rectal Q4H PRN    senna-docusate (PERICOLACE) 8.6-50 mg per tablet 2 Tab  2 Tab Oral QHS    albuterol (PROVENTIL VENTOLIN) nebulizer solution 2.5 mg  2.5 mg Nebulization V4Q PRN    folic acid (FOLVITE) 1 mg, thiamine (B-1) 100 mg in 0.9% sodium chloride 50 mL ivpb   IntraVENous ACD    insulin lispro (HUMALOG) injection   SubCUTAneous AC&HS    glucose chewable tablet 16 g  4 Tab Oral PRN    glucagon (GLUCAGEN) injection 1 mg  1 mg IntraMUSCular PRN    dextrose (D50W) injection syrg 12.5-25 g  25-50 mL IntraVENous PRN    piperacillin-tazobactam (ZOSYN) 4.5 g in 0.9% sodium chloride (MBP/ADV) 100 mL MBP  4.5 g IntraVENous Q8H       ______________________________________________________________________      Lab Review:     Recent Labs      08/06/18   0935   WBC  4.6   HGB  13.4   HCT  38.6   PLT  248     Recent Labs      08/06/18   1036   NA  140   K  3.2*   CL  101   CO2  30   GLU  34*   BUN  11 CREA  0.88   CA  8.8     No components found for: GLPOC  No results for input(s): PH, PCO2, PO2, HCO3, FIO2 in the last 72 hours. No results for input(s): INR in the last 72 hours.     No lab exists for component: INREXT, INREXT           Assessment:     Principal Problem:    TIA (transient ischemic attack) (8/6/2018)    Active Problems:    Speech abnormality (8/6/2018)      Dysarthria (8/6/2018)      Dog bite (8/6/2018)      DM (diabetes mellitus) ([address] (8/6/2018)      Hypoglycemia (8/6/2018)           Plan:       TIA with dyasrthria  -resolved  -I suspect this is more likely 2/2 hypoglycemic episodes however given hx of CVA will need to r/o as recommended by Tele neuro  -per staff patient is essentially back at baseline as they have taken care of her before.   -asa, statin  -CT head negative  -neuro consult   -MRI/MRA, head neck  -ECHO  -neuro checks per protocol     DM II with hypoglycemia  -monitor  -tele  -D5 prn  -may need D5 gtt low dose if no goes back down     Dog Bite  -x-ray negative for osteo  -will switch zosyn to augmentin  -HD stable  -known dog per patient has had rabie shots  -afebrile  No leukcoytosis  -will do single agent for now     DVT prophaylxis  -scd/teds  -heparin sq         ___________________________________________________    Attending Physician: Will Duff NP

## 2018-08-07 NOTE — PROGRESS NOTES
NUTRITION  Patient/Family Education Record    FACTORS THAT MAY INFLUENCE PATIENTS ABILITY TO LEARN:   []   Language barrier    []   Cultural needs   []   Motivation    []   Cognitive limitation    []   Physical   []   Education   []   Physiological factors   []   Hearing/vision/speaking impairment   []   Protestant beliefs    []   Financial limitations    []  Other:   [x]   No barriers limiting ability to learn     Person Instructed:   [x]   Patient   []   Family   []  Other     Preference for Learning:   [x]   Verbal   [x]   Written   []  Demonstration     Patient educated on:   [x] Cardiac/heart healthy diet  [x] 2gm Sodium diet  [] Vitamin K regulated diet (coumadin)  [x] Weight loss/portion control   [] High protein  [x] Other:Consistent CHO    Goal:  Patient will demonstrate understanding of modified diet by implementing suggestions into lifestyle changes once D/C     Outcome:   [x]  Patient verbalized understanding of education and willing to comply with recommendations.   []  Patient declined education  []  Patient needs follow up education; scheduled date for follow up:  [x]  Written information provided  [x]  RD contact information provided    Jesus Longo

## 2018-08-07 NOTE — PROGRESS NOTES
Nutrition initial assessment/Plan of care      RECOMMENDATIONS:   1. Consistent CHO 2g Na Diet  2. Monitor weight, labs and PO intake  3. RD to follow     GOALS:   1. PO intake meets >75% of protein/calorie needs by 8/12  2. Weight Maintenance/gradual loss (1-2 lb/week) by  8/14     ASSESSMENT:   Wt status is classified as obese per BMI of 38.8. Poor PO intake per vitals. Labs noted. BG range () over the past 24 hours; A1C (7.2). Pt w/ hypokalemia and hypertriglyceridemia (187). Nutrition recommendations listed. RD to follow. Nutrition Diagnoses:   Obesity related to excessive energy intake as evidenced by a BMI of 38.8 and 185% IBW. Altered nutrition related lab values related to TIA as evidenced by elevated triglyceride (187) and A1C (7.2). Nutrition Risk:  [] High  [x] Moderate []  Low    SUBJECTIVE/OBJECTIVE:    Pt admitted for speech abnormality. PMHx including DM, HTN and previous CVA. Pt seen in room resting. Denies having any food allergies or problems chewing/swallowing and stable wt PTA. Reports having a good appetite normally, but poor since in the hospital. Pt stated she is having some nausea; RN informed. Explained process of meal service and encouraged involvement with menu selection to increase intake and preferences to be met. Will monitor PO intake for now and may order nutritional supplements if appetite dos not improve. Pt not currently following any type of special diet. Discussed some recommendations for low sat fat, low Na, consistent CHO and portion control once feeling better; handouts provided. Encouraged intake and will monitor. Information Obtained from:    [x] Chart Review   [x] Patient   [] Family/Caregiver   [] Nurse/Physician   [] Interdisciplinary Meeting/Rounds     Diet: Regular Diet  Medications: [x] Reviewed    Allergies: [x] Reviewed   Encounter Diagnoses     ICD-10-CM ICD-9-CM   1. Transient cerebral ischemia, unspecified type G45.9 435.9   2.  Diarrhea, unspecified type R19.7 787.91   3. Weakness R53.1 780.79   4. Animal bite of right hand with infection, initial encounter S61.451A 882.1    L08.9 E906.5     Past Medical History:   Diagnosis Date    Cancer [facility])     [facility] obstructive pulmonary disease ([facility]     Diabetes ([facility]     Hypertension     Stroke ([facility]       Labs:  Lab Results   Component Value Date/Time    Sodium 140 08/06/2018 10:36 AM    Potassium 3.2 (L) 08/06/2018 10:36 AM    Chloride 101 08/06/2018 10:36 AM    CO2 30 08/06/2018 10:36 AM    Anion gap 9 08/06/2018 10:36 AM    Glucose 34 (LL) 08/06/2018 10:36 AM    BUN 11 08/06/2018 10:36 AM    Creatinine 0.88 08/06/2018 10:36 AM    Calcium 8.8 08/06/2018 10:36 AM    Magnesium 2.0 05/26/2016 10:10 PM    Phosphorus 2.9 05/27/2016 03:40 AM    Albumin 2.7 (L) 05/30/2016 04:55 AM     Anthropometrics: BMI (calculated): 38.8  Last 3 Recorded Weights in this Encounter    08/06/18 0833   Weight: 108.9 kg (240 lb)    Ht Readings from Last 1 Encounters:   08/06/18 5' 6\" (1.676 m)     Weight Metrics 8/6/2018 5/27/2016   Weight 240 lb 224 lb 6.9 oz   BMI 38.74 kg/m2 42.43 kg/m2       Patient Vitals for the past 100 hrs:   % Diet Eaten   08/07/18 0951 20 %   08/06/18 1835 25 %       IBW: 130 lb %IBW: 185%   [] Weight Loss [] Weight Gain [x] Weight Stable    Estimated Nutrition Needs: [x] MSJ  [] Other:  Calories: 8460-3135 kcal Based on:   [x] Actual BW    Protein:    g Based on:   [x] Actual BW    Fluid:       4692-8960 ml Based on:   [x] Actual BW      [x] No Cultural, Rastafarian or ethnic dietary need identified.     [] Cultural, Rastafarian and ethnic food preferences identified and addressed     Wt Status:  [] Normal (18.6 - 24.9) [] Underweight (<18.5) [] Overweight (25 - 29.9) [] Mild Obesity (30 - 34.9)  [x] Moderate Obesity (35 - 39.9) [] Morbid Obesity (40+)       Nutrition Problems Identified:   [x] Suboptimal PO intake   [] Food Allergies  [] Difficulty chewing/swallowing/poor dentition  [] Constipation/Diarrhea   [x] Nausea/Vomiting   [] None  [] Other:     Plan:   [x] Therapeutic Diet  []  Obtained/adjusted food preferences/tolerances and/or snacks options   []  Supplements added   [] Occupational therapy following for feeding techniques  []  HS snack added   []  Modify diet texture   []  Modify diet for food allergies   [x]  Educate patient   []  Assist with menu selection   [x]  Monitor PO intake on meal rounds   [x]  Continue inpatient monitoring and intervention   []  Participated in discharge planning/Interdisciplinary rounds/Team meetings   []  Other:     Education Needs:   [] Not appropriate for teaching at this time due to:   [x] Identified and addressed    Nutrition Monitoring and Evaluation:  [x] Continue ongoing monitoring and intervention  [] Other    Delvin Martinez

## 2018-08-08 ENCOUNTER — APPOINTMENT (OUTPATIENT)
Dept: NON INVASIVE DIAGNOSTICS | Age: 78
End: 2018-08-08
Attending: NURSE PRACTITIONER
Payer: MEDICARE

## 2018-08-08 LAB
GLUCOSE BLD STRIP.AUTO-MCNC: 140 MG/DL (ref 70–110)
GLUCOSE BLD STRIP.AUTO-MCNC: 248 MG/DL (ref 70–110)
GLUCOSE BLD STRIP.AUTO-MCNC: 255 MG/DL (ref 70–110)
GLUCOSE BLD STRIP.AUTO-MCNC: 278 MG/DL (ref 70–110)

## 2018-08-08 PROCEDURE — 74011250637 HC RX REV CODE- 250/637: Performed by: NURSE PRACTITIONER

## 2018-08-08 PROCEDURE — 97530 THERAPEUTIC ACTIVITIES: CPT

## 2018-08-08 PROCEDURE — 74011250637 HC RX REV CODE- 250/637: Performed by: HOSPITALIST

## 2018-08-08 PROCEDURE — 97535 SELF CARE MNGMENT TRAINING: CPT

## 2018-08-08 PROCEDURE — 96375 TX/PRO/DX INJ NEW DRUG ADDON: CPT

## 2018-08-08 PROCEDURE — 77030038269 HC DRN EXT URIN PURWCK BARD -A

## 2018-08-08 PROCEDURE — 74011636637 HC RX REV CODE- 636/637: Performed by: NURSE PRACTITIONER

## 2018-08-08 PROCEDURE — 96376 TX/PRO/DX INJ SAME DRUG ADON: CPT

## 2018-08-08 PROCEDURE — 82962 GLUCOSE BLOOD TEST: CPT

## 2018-08-08 PROCEDURE — 74011250636 HC RX REV CODE- 250/636: Performed by: HOSPITALIST

## 2018-08-08 PROCEDURE — 99218 HC RM OBSERVATION: CPT

## 2018-08-08 RX ORDER — AMOXICILLIN AND CLAVULANATE POTASSIUM 562.5; 437.5; 62.5 MG/1; MG/1; MG/1
1 TABLET, FILM COATED, EXTENDED RELEASE ORAL EVERY 12 HOURS
Qty: 14 TAB | Refills: 0 | Status: SHIPPED | OUTPATIENT
Start: 2018-08-08 | End: 2018-08-29

## 2018-08-08 RX ORDER — FOLIC ACID 1 MG/1
1 TABLET ORAL DAILY
Status: DISCONTINUED | OUTPATIENT
Start: 2018-08-08 | End: 2018-08-09 | Stop reason: HOSPADM

## 2018-08-08 RX ORDER — ASPIRIN 325 MG/1
100 TABLET, FILM COATED ORAL DAILY
Status: DISCONTINUED | OUTPATIENT
Start: 2018-08-08 | End: 2018-08-09 | Stop reason: HOSPADM

## 2018-08-08 RX ADMIN — INSULIN LISPRO 6 UNITS: 100 INJECTION, SOLUTION INTRAVENOUS; SUBCUTANEOUS at 13:36

## 2018-08-08 RX ADMIN — INSULIN LISPRO 4 UNITS: 100 INJECTION, SOLUTION INTRAVENOUS; SUBCUTANEOUS at 17:22

## 2018-08-08 RX ADMIN — ATORVASTATIN CALCIUM 80 MG: 40 TABLET, FILM COATED ORAL at 23:28

## 2018-08-08 RX ADMIN — DOCUSATE SODIUM AND SENNOSIDES 2 TABLET: 8.6; 5 TABLET, FILM COATED ORAL at 23:28

## 2018-08-08 RX ADMIN — ONDANSETRON 4 MG: 2 INJECTION INTRAMUSCULAR; INTRAVENOUS at 09:59

## 2018-08-08 RX ADMIN — Medication 100 MG: at 10:03

## 2018-08-08 RX ADMIN — INSULIN LISPRO 4 UNITS: 100 INJECTION, SOLUTION INTRAVENOUS; SUBCUTANEOUS at 00:45

## 2018-08-08 RX ADMIN — AMOXICILLIN AND CLAVULANATE POTASSIUM 1 TABLET: 562.5; 437.5; 62.5 TABLET, MULTILAYER, EXTENDED RELEASE ORAL at 09:58

## 2018-08-08 RX ADMIN — AMOXICILLIN AND CLAVULANATE POTASSIUM 1 TABLET: 562.5; 437.5; 62.5 TABLET, MULTILAYER, EXTENDED RELEASE ORAL at 23:37

## 2018-08-08 RX ADMIN — FOLIC ACID 1 MG: 1 TABLET ORAL at 13:36

## 2018-08-08 RX ADMIN — ASPIRIN 325 MG ORAL TABLET 325 MG: 325 PILL ORAL at 09:58

## 2018-08-08 RX ADMIN — ONDANSETRON 4 MG: 2 INJECTION INTRAMUSCULAR; INTRAVENOUS at 17:35

## 2018-08-08 NOTE — PROGRESS NOTES
auth started for snf at [facility], . fax'd clinicals and referral placed in availity.  Notified pt's

## 2018-08-08 NOTE — PROGRESS NOTES
Assumed care of patient from [facility] (offgoing nurse). Patient awake in bed, NAD. Dual NIH completed with offgoing nurse. Bed locked and in lowest position with call bell in reach. 1525- Patient to ECHO. [phone]- Patient has d/c order. No auth from insurance for SNF. Patient will be discharged once auth is obtained. 1550- Discharge cancelled. 1920-Bedside and Verbal shift change report given to Ketty Irby RN  (oncoming nurse) by Ben Foster (offgoing nurse). Report included the following information SBAR, Kardex, Intake/Output, MAR and Recent Results.

## 2018-08-08 NOTE — PROGRESS NOTES
Problem: Falls - Risk of  Goal: *Absence of Falls  Document Efrain Fall Risk and appropriate interventions in the flowsheet. Outcome: Progressing Towards Goal  Fall Risk Interventions:  Mobility Interventions: Communicate number of staff needed for ambulation/transfer, Strengthening exercises (ROM-active/passive)    Mentation Interventions: Adequate sleep, hydration, pain control, Door open when patient unattended, Increase mobility, More frequent rounding    Medication Interventions: Evaluate medications/consider consulting pharmacy    Elimination Interventions: Call light in reach, Patient to call for help with toileting needs, Toileting schedule/hourly rounds    History of Falls Interventions: Evaluate medications/consider consulting pharmacy        Problem: Pressure Injury - Risk of  Goal: *Prevention of pressure injury  Document Ganga Scale and appropriate interventions in the flowsheet.    Outcome: Progressing Towards Goal  Pressure Injury Interventions:  Sensory Interventions: Assess changes in LOC, Pressure redistribution bed/mattress (bed type)    Moisture Interventions: Absorbent underpads, Apply protective barrier, creams and emollients, Check for incontinence Q2 hours and as needed, Internal/External urinary devices, Maintain skin hydration (lotion/cream), Moisture barrier    Activity Interventions: Pressure redistribution bed/mattress(bed type)    Mobility Interventions: Pressure redistribution bed/mattress (bed type)    Nutrition Interventions: Document food/fluid/supplement intake    Friction and Shear Interventions: Apply protective barrier, creams and emollients, Foam dressings/transparent film/skin sealants

## 2018-08-08 NOTE — DISCHARGE SUMMARY
Internal Medicine Discharge Summary        Patient: Carl Rossi    YOB: 1940    Age:  70 y.o. Admit Date: 8/6/2018    Discharge Date: 8/8/2018    LOS:  LOS: 1 day     Discharge To: SNF    Consults: None    Admission Diagnoses: Speech abnormality  TIA (transient ischemic attack)    Discharge Diagnoses:    Problem List as of 8/8/2018  Never Reviewed          Codes Class Noted - Resolved    Speech abnormality ICD-10-CM: R47.9  ICD-9-CM: 784.59  8/6/2018 - Present        Dysarthria ICD-10-CM: R47.1  ICD-9-CM: 784.51  8/6/2018 - Present        Dog bite ICD-10-CM: W54. 0XXA  ICD-9-CM: 879.8, E906.0  8/6/2018 - Present        DM (diabetes mellitus) ([facility] ICD-10-CM: E11.9  ICD-9-CM: 250.00  8/6/2018 - Present        Hypoglycemia ICD-10-CM: E16.2  ICD-9-CM: 251.2  8/6/2018 - Present        * (Principal)TIA (transient ischemic attack) ICD-10-CM: G45.9  ICD-9-CM: 435.9  8/6/2018 - Present        Sepsis ([facility] ICD-10-CM: A41.9  ICD-9-CM: 038.9, 995.91  5/25/2016 - Present        Encephalopathy ICD-10-CM: G93.40  ICD-9-CM: 348.30  5/25/2016 - Present              Discharge Condition:  Improved             HPI: Carl Rossi is a 70year old female with hx of DM, HTN, previous CVA who presents to the ED after being brought in by [facility] for hypoglycemia with dysarthria and a dog bite on the right hand. Patient was found to have BS of 34 and subsequently was given D5W with improvement in BS.  Patient was recently placed on abx after dog bite by PCP. Patient apparently over the past day was some dysartheria different from her baseline. Tele neuro was consulted and recommeneded admission for TIA r/o. She notes some discomfort from dog bite to right hand. Patient is neurologically back to baseline per . She deneis any sob, cp, fever, chills, n/v, or any other acute complaint at this time. Hospital Course (Including Significant Findings):  The patient was admitted to the hospital for further management of their presenting condition. The rest of the patient's chronic conditions were managed appropriately during their admission. They were medically stable at the time of discharge. Patient was admitted for the above noting concern for TIA. BS improved. Patient returned to baseline though day of admission. MRI/MRA head and neck noted moderate to severe bilateral internal carotid stenosis with recommendation due to motion degradation to follow up with duplex arterial sonography. Discussion with attending regarding results of MRI/MRA and it was suspected stroke was 2/2 hypoglycemia and that patient could be discharged with follow with Vascular. X-ray was was negative for osteo in hand where she sustatined dog bite (rabie shot per patient who she knows dog). She will be discharged on augmentin. She will be discharged to SNF facility per case management. Patient is agreeable to this.      Visit Vitals    /83 (BP 1 Location: Right arm, BP Patient Position: At rest)    Pulse (!) 105    Temp 97.5 °F (36.4 °C)    Resp 16    Ht 5' 6\" (1.676 m)    Wt 108.9 kg (240 lb)    SpO2 92%    BMI 38.74 kg/m2       Physical Exam at Discharge:  General Appearance: NAD, conversant  HENT: normocephalic/atraumatic, moist mucus membranes  Lungs: CTA with normal respiratory effort  CV: RRR, no m/r/g  Abdomen: soft, non-tender, normal bowel sounds  Extremities: right hand redness/wound  Neuro: generalized weakness  Psych: appropriate affect, alert and oriented to person, place and time    Labs Prior to Discharge:  Labs: Results:       Chemistry Recent Labs      08/06/18   1036   GLU  34*   NA  140   K  3.2*   CL  101   CO2  30   BUN  11   CREA  0.88   CA  8.8   AGAP  9   BUCR  13      CBC w/Diff Recent Labs      08/06/18   0935   WBC  4.6   RBC  4.05*   HGB  13.4   HCT  38.6   PLT  248   GRANS  89*   LYMPH  5*   EOS  1      Cardiac Enzymes Recent Labs      08/06/18   1036   CPK  275*   CKND1  1.4 Coagulation No results for input(s): PTP, INR, APTT in the last 72 hours. No lab exists for component: INREXT, INREXT    Lipid Panel Lab Results   Component Value Date/Time    Cholesterol, total 115 08/06/2018 10:36 AM    HDL Cholesterol 22 (L) 08/06/2018 10:36 AM    LDL, calculated 55.6 08/06/2018 10:36 AM    VLDL, calculated 37.4 08/06/2018 10:36 AM    Triglyceride 187 (H) 08/06/2018 10:36 AM    CHOL/HDL Ratio 5.2 (H) 08/06/2018 10:36 AM      BNP No results for input(s): BNPP in the last 72 hours. Liver Enzymes No results for input(s): TP, ALB, TBIL, AP, SGOT, GPT in the last 72 hours. No lab exists for component: DBIL   Thyroid Studies Lab Results   Component Value Date/Time    TSH 0.87 08/06/2018 10:36 AM            Significant Imaging:  Xr Hand Rt Min 3 V    Result Date: 8/6/2018  Examination: Right hand 3 views. HISTORY: Right hand pain after animal bite. FINDINGS: Dorsal, oblique, and lateral projections of the right hand are performed and interpreted without comparison. There is mild radial subluxation of the thumb at the [facility] joint noted without acute malalignment on the provided images. There is no fracture. Severe basal thumb joint osteoarthritis is present along with more moderate appearing interphalangeal joint osteoarthritis of the thumb and index fingers. No retained radiopaque foreign object. Atherosclerotic vascular calcifications noted about the right wrist.     IMPRESSION: 1. Degenerative changes as described, without acute osseous abnormality or malalignment involving the right hand. Mra Brain Wo Cont    Result Date: 8/8/2018  MR angiogram of the [place] without contrast 8/7/2018. INDICATION:   Weakness. COMPARISON:  [None]. TECHNIQUE:  3-D time-of-flight MRA of the brain was performed. MIP reconstructions were obtained. FINDINGS:   The examination is degraded by motion.   Right vertebral artery dominance is noted and the left vertebral artery appears to predominantly end in the posterior inferior cerebellar artery. There is irregularity of both carotid siphons with moderate areas of stenosis, consistent with underlying atherosclerotic vascular disease. Mild areas of stenosis are suggested along the M1 segment of the right middle cerebral artery. There is no evidence of hemodynamically significant stenosis or occlusion involving the main intracranial branches. There is a 1-2 mm outpouching arising from the inferior aspect of the distal supraclinoid segment of the left internal carotid artery. This is in the anticipated location of a posterior communicating artery infundibulum but a definite branch is not identifiable arising from the apex of this vessel and, therefore, a small saccular aneurysm cannot be excluded. There are no additional areas suspicious for aneurysm formation and there is no evidence of underlying vascular malformation. IMPRESSION: Motion degraded evaluation. 1.  Intracranial steno-occlusive disease with mild to moderate areas of stenosis, as described above. 2.  Left posterior communicating artery infundibulum versus a small saccular aneurysm, as described above. 3.  Otherwise, unremarkable evaluation without evidence of hemodynamically significant stenosis or occlusion involving the main intracranial branches. [place] Neck W Wo Cont    Result Date: 8/8/2018  MRA of the neck without and with contrast 8/7/2018. History: Altered mental status and weakness. Technique: 2 time-of-flight and 3-D postcontrast MRA of the cervical vasculature were performed. MIP reconstructions were obtained. No prior studies are available for comparison. Findings: The examination is degraded by motion. The origins of the innominate artery and left common carotid artery from the aortic arch are patent. The origin of the left subclavian artery is obscured by motion with possible stenosis suggested. The origin of the right common carotid artery from the innominate artery is patent. The right common carotid artery is grossly patent throughout its course. The right carotid bifurcation is also obscured by motion with moderate to severe stenosis suggested by NASCET criteria. The right internal carotid artery is otherwise patent throughout its course. The left common carotid artery is also grossly patent throughout its course. The left carotid bifurcation is also partially obscured by motion. Severe stenosis is suggested at the origin of the left internal carotid artery by NASCET criteria. The left internal carotid arteries otherwise patent throughout its cervical course. Right vertebral artery dominance is noted. The right vertebral artery origin is partially obscured by motion but appears to be patent. The left vertebral artery is diminutive in size and the origin is obscured. The distal left vertebral artery is also obscured by venous contamination. The visualized portions of both vertebral arteries are otherwise patent. The left vertebral artery appears to predominantly and in the posterior inferior cerebellar artery. The right vertebrobasilar junction is intact. Impression: 1. Motion degraded evaluation with partial obscuration of the carotid bifurcations, as well as the nondominant left vertebral artery origin. 2.  Moderate to severe stenosis suggested at the origin of the right internal carotid artery by NASCET criteria with severe stenosis suggested on the left. However, given the motion degradation, correlation with duplex arterial sonography is suggested for further characterization. Mri Brain W Wo Cont    Result Date: 8/8/2018  EXAM: MRI brain without and with contrast 8/7/2018 INDICATION: Stroke. Altered mental status. Weakness. COMPARISON: CT scan of the head from 8/6/2018. TECHNIQUE: Multiplanar multisequential images of the brain were obtained before and after the administration of 20 cc of IV Dotarem.  _______________ FINDINGS: BRAIN AND POSTERIOR FOSSA: Moderate, diffuse cerebral and cerebellar atrophy are present. Areas of old infarction are noted involving the central ross, bilateral thalami, bilateral basal ganglia, and in the right frontal corona radiata white matter. There is also a moderate-sized area of old infarct in the high left posterior frontal region and there is a patchy area of old infarction along the high right frontoparietal region. There is no intracranial hemorrhage. There is no mass effect or midline shift. There are no significant additional areas of abnormal parenchymal signal.  There is no true restricted diffusion to suggest acute infarct. There is no abnormal cerebral or cerebellar enhancement. EXTRA-AXIAL SPACES AND MENINGES: There are no abnormal extra-axial fluid collections. There is no abnormal meningeal enhancement. VASCULAR: The visualized portions of the intracranial carotid and vertebrobasilar systems demonstrate patent appearing flow voids. CALVARIA: Normal. SINUSES: Clear. CRANIOCERVICAL JUNCTION: Normal. OTHER: None. _______________     IMPRESSION: 1. Atrophy and chronic small vessel changes with multiple areas of old infarction. 2.  Otherwise, unremarkable evaluation. Specifically, no acute intracranial abnormalities are identified. Ct Head Wo Cont    Result Date: 8/6/2018  EXAM: CT head INDICATION: Lethargy, hypoglycemia. COMPARISON: CT head performed the 20/5/2016 TECHNIQUE: Axial CT imaging of the head was performed without intravenous contrast. One or more dose reduction techniques were used on this CT: automated exposure control, adjustment of the mAs and/or kVp according to patient's size, and iterative reconstruction techniques. The specific techniques utilized on this CT exam have been documented in the patient's electronic medical record. _______________ FINDINGS: BRAIN AND POSTERIOR FOSSA: Cortical and cerebellar volume loss is redemonstrated, similar to prior examination, and within normal limits for patient age. Focal area of encephalomalacia within the left frontal lobe, unchanged. Subcortical and periventricular white matter hypoattenuation similar to prior. Ventricular size and configuration stable. Basilar cisterns remain patent. There is no intracranial hemorrhage, mass effect, or midline shift. The gray-white matter differentiation is within normal limits. EXTRA-AXIAL SPACES AND MENINGES: There are no abnormal extra-axial fluid collections. CALVARIUM: No acute osseous abnormality. SINUSES: Small amount of fluid is noted layering within the left aspect of the sphenoid sinus. Remaining imaged paranasal sinuses and mastoid air cells are clear. OTHER: Atherosclerotic vascular calcifications of the carotid siphons noted bilaterally. _______________     IMPRESSION: 1. No acute intracranial abnormality. Stable examination. 2. Subcortical and periventricular white matter hypoattenuation; unchanged and nonspecific, likely reflecting chronic ischemic microvascular change. 3. Minimal, nonspecific fluid layering within the left aspect of the sphenoid sinus. Xr Chest Port    Result Date: 8/6/2018  Chest, single view Indication: Lethargy, hypoglycemia Comparison: 5/26/2016 Findings:  Portable upright AP view of the chest was obtained. The patient is rotated on the provided projection with associated foreshortening of the right hemithorax. Lungs are mildly underexpanded without focal pneumonic consolidation, pneumothorax, or pleural effusion. Stable cardiac size and mediastinal contours. No acute osseous abnormality. Surgical clips project over the left axillary soft tissues. Impression: Rotated projection of the chest and mildly underexpanded lungs without superimposed acute radiographic cardiopulmonary abnormality.            Discharge Medications:     Current Discharge Medication List      START taking these medications    Details   amoxicillin-clavulanate (AUGMENTIN) 1,000-62.5 mg per tablet Take 1 Tab by mouth every twelve (12) hours. Indications: dog bite  Qty: 14 Tab, Refills: 0         CONTINUE these medications which have NOT CHANGED    Details   losartan (COZAAR) 100 mg tablet Take 100 mg by mouth daily. aspirin (ASPIRIN) 325 mg tablet Take 325 mg by mouth daily. Indications: MYOCARDIAL INFARCTION PREVENTION, PREVENTION OF TRANSIENT ISCHEMIC ATTACKS      atorvastatin (LIPITOR) 20 mg tablet Take 20 mg by mouth daily. Indications: HYPERLIPIDEMIA, PREVENTION OF CEREBROVASCULAR ACCIDENT      hydrochlorothiazide (HYDRODIURIL) 25 mg tablet Take 25 mg by mouth daily. Indications: EDEMA      HYDROcodone-acetaminophen (NORCO) 7.5-325 mg per tablet Take 1 Tab by mouth every twelve (12) hours as needed for Pain. Indications: PAIN      metFORMIN (GLUCOPHAGE) 500 mg tablet Take 500 mg by mouth two (2) times daily (with meals). Indications: TYPE 2 DIABETES MELLITUS      ipratropium (ATROVENT) 0.02 % nebulizer solution 0.5 mg by Nebulization route every six (6) hours as needed for Wheezing. albuterol (PROVENTIL VENTOLIN) 2.5 mg /3 mL (0.083 %) nebulizer solution 2.5 mg by Nebulization route every six (6) hours as needed for Wheezing. gabapentin (NEURONTIN) 300 mg capsule Take 600 mg by mouth three (3) times daily. Indications: feet pain      fluconazole (DIFLUCAN) 200 mg tablet Take 200 mg by mouth ACB/HS. albuterol (PROVENTIL HFA, VENTOLIN HFA, PROAIR HFA) 90 mcg/actuation inhaler Take 2 Puffs by inhalation every four (4) hours as needed for Wheezing. Indications: SOB      glimepiride (AMARYL) 4 mg tablet Take 4 mg by mouth ACB/HS. traZODone (DESYREL) 300 mg tablet Take 300 mg by mouth nightly. Activity: PT/OT Eval and Treat    Diet: Diabetic Diet    Wound Care: Keep wound clean and dry    Follow-up:   Please follow up with your PCP within 7 days to discuss your recent hospitalization. Patient to arrange.          Total time spent including time spent on final examination and discharge discussion, discharge documentation and records reviewed and medication reconciliation: > 30 minutes    Elkin Melton, ACNP-BC  Internal Medicine  Pager: [phone]  [address] [place] [address] Drive [employer]

## 2018-08-08 NOTE — PROGRESS NOTES
Assumed care of pt from [facility]. Alert and oriented x 3. Report included SBAR, MAR, kardex. Frequent use items within reach. Bed locked in lowest position. Call bell within reach. Pt verbalizes understanding to call for assistance. 2055 Patient going to MRI. 2245 Returned same. 2307 medicated for nausea. Bathed and bed changed. 0720 Bedside shift change report given to 2600 Anand Campuzano (oncoming nurse) by Amanda Delarosa (offgoing nurse). Report included the following information SBAR, Kardex and MAR.

## 2018-08-08 NOTE — PROGRESS NOTES
Medical Progress Note      NAME: Sinai Haas   :  1940  MRM:  [id number]    Date/Time: 2018  11:46 AM         Subjective:     Sinai Haas is a 70year old female with hx of DM, HTN, previous CVA who presents to the ED after being brought in by [facility] for hypoglycemia with dysarthria and a dog bite on the right hand. Patient was found to have BS of 34 and subsequently was given D5W with improvement in BS. Patient was recently placed on abx after dog bite by PCP. Patient apparently over the past day was some dysartheria different from her baseline. Tele neuro was consulted and recommeneded admission for TIA r/o. She notes some discomfort from dog bite to right hand. Patient is neurologically back to baseline per . She deneis any sob, cp, fever, chills, n/v, or any other acute complaint at this time    \"im ok\"    Past Medical History reviewed and unchanged from Admission History and Physical    Review of Systems   Constitutional: Negative. Eyes: Negative. Respiratory: Negative. Cardiovascular: Negative. Gastrointestinal: Negative. Genitourinary: Negative. Musculoskeletal: Negative. Neurological: Negative. Hematological: Negative. Objective:     Looks better compared to yesterday. ECHO pending. MRI/MRA results still pending. As stated yesterday disucssion with ED noted patient at baseline who are familiar with her. Suspected 2/2 hypoglycemia. Hand looks slightly improved. When discharged can probably be placed on augmentin. I have called MRI to see if they can expedite the read. 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 I have ordered. 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  Left posterior communicating artery infundibulum versus a small saccular aneurysm, as described above. Otherwise, unremarkable evaluation without evidence of hemodynamically significant stenosis or occlusion involving the main intracranial branches. \" Pending results may need to consult vascular vs Neuro IR. May need Neurology for recommendations    Vitals:      Last 24hrs VS reviewed since prior progress note. Most recent are:    Visit Vitals    /83 (BP 1 Location: Right arm, BP Patient Position: At rest)    Pulse (!) 105    Temp 97.5 °F (36.4 °C)    Resp 16    Ht 5' 6\" (1.676 m)    Wt 108.9 kg (240 lb)    SpO2 92%    BMI 38.74 kg/m2     SpO2 Readings from Last 6 Encounters:   08/08/18 92%   05/30/16 93%            Intake/Output Summary (Last 24 hours) at 08/08/18 1445  Last data filed at 08/08/18 8540   Gross per 24 hour   Intake              200 ml   Output                0 ml   Net              200 ml          Exam:      Physical Exam   Constitutional: No distress. HENT:   Head: Normocephalic and atraumatic. Eyes: Conjunctivae are normal. Pupils are equal, round, and reactive to light. Neck: Normal range of motion. Neck supple. No JVD present. Cardiovascular: Normal rate and regular rhythm. Exam reveals no friction rub. No murmur heard. Pulmonary/Chest: Effort normal and breath sounds normal. No stridor. No respiratory distress. She has no wheezes. She has no rales. She exhibits no tenderness. Abdominal: Soft. Bowel sounds are normal. She exhibits no distension and no mass. There is no tenderness. There is no rebound and no guarding. Musculoskeletal: She exhibits no edema or deformity. Neurological: She is alert. Skin: Skin is warm and dry. No rash noted. She is not diaphoretic. No pallor.    Redness to right hand         Medications:  Current Facility-Administered Medications   Medication Dose Route Frequency    folic acid (FOLVITE) tablet 1 mg  1 mg Oral DAILY    Thiamine Mononitrate (B-1) tablet 100 mg  100 mg Oral DAILY    amoxicillin-clavulanate (AUGMENTIN) 1,000-62.5 mg per tablet 1 Tab  1 Tab Oral Q12H    ondansetron (ZOFRAN) injection 4 mg  4 mg IntraVENous Q4H PRN    aspirin (ASPIRIN) tablet 325 mg  325 mg Oral DAILY    atorvastatin (LIPITOR) tablet 80 mg  80 mg Oral QHS    acetaminophen (TYLENOL) tablet 650 mg  650 mg Oral Q4H PRN    acetaminophen (TYLENOL) suppository 650 mg  650 mg Rectal Q4H PRN    senna-docusate (PERICOLACE) 8.6-50 mg per tablet 2 Tab  2 Tab Oral QHS    albuterol (PROVENTIL VENTOLIN) nebulizer solution 2.5 mg  2.5 mg Nebulization Q4H PRN    insulin lispro (HUMALOG) injection   SubCUTAneous AC&HS    glucose chewable tablet 16 g  4 Tab Oral PRN    glucagon (GLUCAGEN) injection 1 mg  1 mg IntraMUSCular PRN    dextrose (D50W) injection syrg 12.5-25 g  25-50 mL IntraVENous PRN       ______________________________________________________________________      Lab Review:     Recent Labs      08/06/18   0935   WBC  4.6   HGB  13.4   HCT  38.6   PLT  248     Recent Labs      08/06/18   1036   NA  140   K  3.2*   CL  101   CO2  30   GLU  34*   BUN  11   CREA  0.88   CA  8.8     No components found for: GLPOC  No results for input(s): PH, PCO2, PO2, HCO3, FIO2 in the last 72 hours. No results for input(s): INR in the last 72 hours.     No lab exists for component: INREXT, Tammie Awad INREXT           Assessment:     Principal Problem:    TIA (transient ischemic attack) (8/6/2018)    Active Problems:    Speech abnormality (8/6/2018)      Dysarthria (8/6/2018)      Dog bite (8/6/2018)      DM (diabetes mellitus) ([facility] (8/6/2018)      Hypoglycemia (8/6/2018)           Plan:       TIA with dyasrthria  -resolved  -I suspect this is more likely 2/2 hypoglycemic episodes however given hx of CVA will need to r/o as recommended by Tele neuro  -per staff patient is essentially back at baseline as they have taken care of her before.   -asa, statin  -CT head negative  -neuro consult   -MRI/MRA, head neck  -ECHO  -neuro checks per protocol     DM II with hypoglycemia  -monitor  -tele  -D5 prn  -may need D5 gtt low dose if no goes back down     Dog Bite  -x-ray negative for osteo  -will switch zosyn to augmentin  -HD stable  -known dog per patient has had rabie shots  -afebrile  No leukcoytosis  -will do single agent for now     DVT prophaylxis  -scd/teds  -heparin sq         ___________________________________________________    Attending Physician: Maame Duran NP

## 2018-08-08 NOTE — PROGRESS NOTES
Problem: Self Care Deficits Care Plan (Adult)  Goal: *Acute Goals and Plan of Care (Insert Text)  Occupational Therapy Goals  Initiated 8/7/2018 within 7 day(s). 1.  Patient will perform grooming tasks while standing with supervision for safety. 2.  Patient will perform lower body dressing with minimal assistance utilizing adaptive strategies, prn.  3.  Patient will perform functional task in standing for 8 minutes with supervision for balance and minimal verbal cues for safety. 4.  Patient will perform toilet transfers with supervision/set-up. 5.  Patient will perform all aspects of toileting with supervision/set-up. 6.  Patient will participate in upper extremity therapeutic exercise/activities with supervision/set-up for 8 minutes to increase BUE strength for functional transfers & ADLs. 7.  Patient will utilize energy conservation techniques during functional activities with minimal verbal cues. Outcome: Progressing Towards Goal  Occupational Therapy TREATMENT    Patient: Cuco Francis (63 y.o. female)  Date: 8/8/2018  Diagnosis: Speech abnormality  TIA (transient ischemic attack) TIA (transient ischemic attack)       Precautions: Fall  Chart, occupational therapy assessment, plan of care, and goals were reviewed. PLOF: Pt reports independent    ASSESSMENT:  Pt OOB, seated in chair upon entry. Pt dysarthric, agreeable to therapy. Pt requires increase time and CGA w/functional mobility to bathroom. Poor dynamic standing balance requires Max Assist w/toileting ADL and clothing mgt. Pt tolerates standing for hand hygiene and requires assist for soap dispenser mgt. Pt returns to EOB and appears diaphoretic. /84 No c/o dizziness or pain. Alerted Cony Daniel.   EDUCATION Pt educated on importance of healthy lifestyle, ie good diet, exercise and medication mgt for stroke prevention  Progression toward goals:  []          Improving appropriately and progressing toward goals  [x]          Improving slowly and progressing toward goals  []          Not making progress toward goals and plan of care will be adjusted     PLAN:  Patient continues to benefit from skilled intervention to address the above impairments. Continue treatment per established plan of care. Discharge Recommendations:  Skilled Nursing Facility  Further Equipment Recommendations for Discharge:  shower chair     SUBJECTIVE:   Patient stated I have a walker at home, but it's not like that.     OBJECTIVE DATA SUMMARY:       Cognitive/Behavioral Status:  Neurologic State: Alert  Orientation Level: Disoriented to time  Cognition: Follows commands  Safety/Judgement: Decreased insight into deficits  Functional Mobility and Transfers for ADLs:   Bed Mobility:  Sit to Supine: Moderate assistance (requires assist w/BLE)   Transfers: Toilet Transfer : Contact guard assistance (w/grab bar)   Bathroom Mobility: Contact guard assistance (w/walker)   Balance:  Sitting: Intact  Sitting - Static: Good (unsupported)  Sitting - Dynamic: Fair (occasional) (fair/fair plus)  Standing: With support; Impaired  Standing - Static: Fair  Standing - Dynamic : Fair (fair/fair minus)  ADL Intervention:  Grooming  Washing Hands: Minimum assistance   Pt requires assist w/soap dispenser mgt    Toileting  Toileting Assistance: Maximum assistance  Bowel Hygiene: Maximum assistance  Clothing Management: Maximum assistance    Pain:  Pre Treatment:0  Post Treatment:0  Pain Scale 1: Numeric (0 - 10)  Pain Intensity 1: 0    Activity Tolerance:    Fair minus    Please refer to the flowsheet for vital signs taken during this treatment.   After treatment:   []  Patient left in no apparent distress sitting up in chair  [x]  Patient left in no apparent distress in bed  [x]  Call bell left within reach  [x]  Nursing notified  []  Caregiver present  []  Bed alarm activated    JACOB Watson  Time Calculation: 32 mins

## 2018-08-08 NOTE — PROGRESS NOTES
Therapy reccommends snf, pt agrees  And needs it, posted to [facility]. Will need auth. pt would like polo bird, called  , unable to leave message, no vm set up will try again, pt states she will tell him.

## 2018-08-08 NOTE — PROGRESS NOTES
Problem: Mobility Impaired (Adult and Pediatric)  Goal: *Acute Goals and Plan of Care (Insert Text)  Physical Therapy Goals  Initiated 8/7/2018 and to be accomplished within 7 day(s)  1. Patient will move from supine to sit and sit to supine  in bed with modified independence. 2.  Patient will transfer from bed to chair and chair to bed with modified independence using the least restrictive device. 3.  Patient will perform sit to stand with modified independence. 4.  Patient will ambulate with modified independence for 150 feet with the least restrictive device. Outcome: Progressing Towards Goal    PHYSICAL THERAPY: Daily TREATMENT Note   OBSERVATION: Medicare: Hospital Day: 3     Patient: Kenrick Ricardo (45 y.o. female)    Date: 8/8/2018  Primary Diagnosis: Speech abnormality  TIA (transient ischemic attack)   Precautions: Fall  Chart, physical therapy assessment, plan of care and goals were reviewed. PLOF: Amb with walker    ASSESSMENT:  Flat affect. Agreeable to PT with encouragement. Min A for supine to sit. Min A x2 for sit to stand to ww. Cues for hand placement. Amb 6ft with min A and ww. Fearful with mobility. Min A for sit to stand. Seated in chair. Educated on need for OOB in chair; encouraged to remain up in chair for lunch. Call schmitt in reach. RN Guanakito Mins aware. Progression toward goals:        Improving appropriately and progressing toward goals     PLAN:  Patient continues to benefit from skilled intervention to address the above impairments. Continue treatment per established plan of care.     EDUCATION:   Education:  Patient was educated on the following topics: Bed mobility, transfers, ADLs, balance, amb, safety, exercise, role of PT, plan of care, cognition, skin integrity, vitals    Barriers to Learning/Limitations: None  Compensate with: visual, verbal, tactile, kinesthetic cues/model    Discharge Recommendations:  Rehab  Further Equipment Recommendations for Discharge:  rolling walker     SUBJECTIVE:   Patient stated Shanell Stanford is my ?     OBJECTIVE DATA SUMMARY:   Critical Behavior:  Neurologic State: Alert  Orientation Level: Oriented to person  Cognition: Follows commands  Safety/Judgement: Decreased insight into deficits    G CODE:Mobility  Current  CL= 60-79%   Goal  CI= 1-19%. The severity rating is based on the [employer] Balance Scale 2/5    [facility] Standing Balance Scale 2/5  0: Pt performs 25% or less of standing activity (Max assist) CN, 100% impaired. 1: Pt supports self with upper extremities but requires therapist assistance. Pt performs 25-50% of effort (Mod assist) CM, 80% to <100% impaired. 1+: Pt supports self with upper extremities but requires therapist assistance. Pt performs >50% effort. (Min assist). CL, 60% to <80% impaired. 2: Pt supports self independently with both upper extremities (walker, crutches, parallel bars). CL, 60% to <80% impaired. 2+: Pt support self independently with 1 upper extremity (cane, crutch, 1 parallel bar). CK, 40% to <60% impaired. 3: Pt stands without upper extremity support for up to 30 seconds. CK, 40% to <60% impaired. 3+: Pt stands without upper extremity support for 30 seconds or greater. CJ, 20% to <40% impaired. 4: Pt independently moves and returns center of gravity 1-2 inches in one plane. CJ, 20% to <40% impaired. 4+: Pt independently moves and returns center of gravity 1-2 inches in multiple planes. CI, 1% to <20% impaired. 5: Pt independently moves and returns center of gravity in all planes greater than 2 inches. CH, 0% impaired.     Functional Mobility:      Functional Status      Indep   (I)   Mod I   Super-vision   Min A   Mod A   Max A   Total A   Assist x2 Verbal cues Additional time Not tested   Comments   Rolling []  []  [] []    []    []  []  [] [] [] [x]    Supine to sit []  []  [] [x]  []  []  []  [] [] [] []    Sit to supine []  []  [] []  []  []  []  [] [] [] [x]    Sit to stand []  []  [] [x]  []  []  []  [x] [] [] []    Stand to sit []  []  [] [x]  []  []  []  [x] [] [] []    Bed to chair transfers []  []  [] [x]  []  []  []  [x] [] [] []        Balance    Good   Fair   Poor   Unable   Not tested   Comments   Sitting static [x]  []  []  []  []    Sitting dynamic []  [x]  []  []  []    Standing static []  [x]  []  []  []    Standing dynamic []  [x]  []  []  []      Mobility/Gait:   Level of Assistance: Minimum assistance and Assist x2  Assistive Device: rolling walker  Distance Ambulated: 6 feet       Vital Signs  Temp: 97.9 °F (36.6 °C)     Pulse (Heart Rate): (!) 107     BP: 148/81     Resp Rate: 16     O2 Sat (%): 100 %  Pain:  Pre treatment pain level: 0  Post treatment pain level: 0  Pain Scale 1: Numeric (0 - 10)  Pain Intensity 1: 0  Activity Tolerance:   Fair     After treatment:   Patient left in no apparent distress sitting up in chair  Call bell left within reach  Nursing notified    Nate Aldana PT   Time Calculation: 19 mins

## 2018-08-08 NOTE — PROGRESS NOTES
Notified nandini and dr manzanares have bed at [facility] but need auth, pt will not dc today.

## 2018-08-08 NOTE — PROGRESS NOTES
Speech Therapy Note:    Could not progress with ST treatment because patient :     [ ] was unresponsive  [ ] deferred due to:   [X] was off the unit   [ ] on hold  [ ] NPO for procedure    SLP will re-attempt treatment later this day or as schedule permits.     Martita Fregoso M.S., CF-SLP  Speech Language Pathologist

## 2018-08-09 VITALS
TEMPERATURE: 97.7 F | RESPIRATION RATE: 18 BRPM | WEIGHT: 250 LBS | BODY MASS INDEX: 46.01 KG/M2 | HEIGHT: 62 IN | SYSTOLIC BLOOD PRESSURE: 161 MMHG | HEART RATE: 94 BPM | DIASTOLIC BLOOD PRESSURE: 93 MMHG | OXYGEN SATURATION: 94 %

## 2018-08-09 LAB
ECHO AO ROOT DIAM: 2.9 CM
ECHO AV AREA PEAK VELOCITY: -5.4 CM2
ECHO AV AREA/BSA PEAK VELOCITY: -2.4 CM2/M2
ECHO AV PEAK GRADIENT: 0.6 MMHG
ECHO AV PEAK VELOCITY: -40.27 CM/S
ECHO LA VOL 2C: 40.15 ML (ref 22–52)
ECHO LA VOL 4C: 56.7 ML (ref 22–52)
ECHO LA VOLUME INDEX A2C: 19.1 ML/M2
ECHO LA VOLUME INDEX A4C: 26.98 ML/M2
ECHO LV EDV A2C: 57.9 ML
ECHO LV EDV A4C: 59.1 ML
ECHO LV EDV BP: 58 ML (ref 56–104)
ECHO LV EDV INDEX A4C: 28.1 ML/M2
ECHO LV EDV INDEX BP: 27.6 ML/M2
ECHO LV EDV NDEX A2C: 27.5 ML/M2
ECHO LV EJECTION FRACTION A2C: 57 %
ECHO LV EJECTION FRACTION A4C: 40 %
ECHO LV EJECTION FRACTION BIPLANE: 48.7 % (ref 55–100)
ECHO LV ESV A2C: 24.7 ML
ECHO LV ESV A4C: 35.3 ML
ECHO LV ESV BP: 29.8 ML (ref 19–49)
ECHO LV ESV INDEX A2C: 11.8 ML/M2
ECHO LV ESV INDEX A4C: 16.8 ML/M2
ECHO LV ESV INDEX BP: 14.2 ML/M2
ECHO LV INTERNAL DIMENSION DIASTOLIC: 3.86 CM (ref 3.9–5.3)
ECHO LV INTERNAL DIMENSION SYSTOLIC: 2.89 CM
ECHO LV IVSD: 0.89 CM (ref 0.6–0.9)
ECHO LV MASS 2D: 123 G (ref 67–162)
ECHO LV MASS INDEX 2D: 58.5 G/M2
ECHO LV POSTERIOR WALL DIASTOLIC: 0.99 CM (ref 0.6–0.9)
ECHO LVOT DIAM: 1.91 CM
ECHO LVOT PEAK GRADIENT: 2.3 MMHG
ECHO LVOT PEAK VELOCITY: 76.56 CM/S
ECHO MV A VELOCITY: 30.62 CM/S
ECHO MV AREA PHT: 6.6 CM2
ECHO MV E DECELERATION TIME (DT): 115.1 MS
ECHO MV E VELOCITY: 1.08 CM/S
ECHO MV E/A RATIO: 0.04
ECHO MV PRESSURE HALF TIME (PHT): 33.4 MS
ECHO TV REGURGITANT MAX VELOCITY: -62.32 CM/S
ECHO TV REGURGITANT PEAK GRADIENT: 1.6 MMHG
GLUCOSE BLD STRIP.AUTO-MCNC: 150 MG/DL (ref 70–110)
GLUCOSE BLD STRIP.AUTO-MCNC: 252 MG/DL (ref 70–110)

## 2018-08-09 PROCEDURE — 74011636637 HC RX REV CODE- 636/637: Performed by: NURSE PRACTITIONER

## 2018-08-09 PROCEDURE — 99218 HC RM OBSERVATION: CPT

## 2018-08-09 PROCEDURE — 97535 SELF CARE MNGMENT TRAINING: CPT

## 2018-08-09 PROCEDURE — 82962 GLUCOSE BLOOD TEST: CPT

## 2018-08-09 PROCEDURE — 74011250637 HC RX REV CODE- 250/637: Performed by: HOSPITALIST

## 2018-08-09 PROCEDURE — 74011250637 HC RX REV CODE- 250/637: Performed by: NURSE PRACTITIONER

## 2018-08-09 RX ORDER — INSULIN GLARGINE 100 [IU]/ML
5 INJECTION, SOLUTION SUBCUTANEOUS
Status: DISCONTINUED | OUTPATIENT
Start: 2018-08-09 | End: 2018-08-09 | Stop reason: HOSPADM

## 2018-08-09 RX ADMIN — FOLIC ACID 1 MG: 1 TABLET ORAL at 09:34

## 2018-08-09 RX ADMIN — ASPIRIN 325 MG ORAL TABLET 325 MG: 325 PILL ORAL at 09:34

## 2018-08-09 RX ADMIN — AMOXICILLIN AND CLAVULANATE POTASSIUM 1 TABLET: 562.5; 437.5; 62.5 TABLET, MULTILAYER, EXTENDED RELEASE ORAL at 09:44

## 2018-08-09 RX ADMIN — INSULIN LISPRO 2 UNITS: 100 INJECTION, SOLUTION INTRAVENOUS; SUBCUTANEOUS at 09:34

## 2018-08-09 RX ADMIN — Medication 100 MG: at 09:34

## 2018-08-09 RX ADMIN — INSULIN LISPRO 6 UNITS: 100 INJECTION, SOLUTION INTRAVENOUS; SUBCUTANEOUS at 12:33

## 2018-08-09 RX ADMIN — INSULIN LISPRO 6 UNITS: 100 INJECTION, SOLUTION INTRAVENOUS; SUBCUTANEOUS at 00:15

## 2018-08-09 NOTE — PROGRESS NOTES
Hospital to [address]                                                                        70 y.o.   female    [address]   Room: [address] [place] [address] 2E GEN SURG  Unit Phone# :  [phone] [facility] 2E GEN SURG  [address] [place] [address]  Dept: Nancy Shepherd: [phone]                    SITUATION     Admitted:  8/6/2018         Attending Provider:  Cherry Menjivar DO       Consultations:  IP CONSULT TO NEUROLOGY  IP CONSULT TO NEUROLOGY  IP CONSULT TO HOSPITALIST    PCP:  Ingris Trinidad MD   [phone]    Treatment Team: Attending Provider: Cherry Menjivar DO; Consulting Provider: Alex Beasley MD; Consulting Provider: Nitza Martinez MD; Utilization Review: Renae Marrero RN; Care Manager: Adithya Beal RN; Occupational Therapy Assistant: JACOB Jerry    Admitting Dx:  Speech abnormality  TIA (transient ischemic attack)       Principal Problem: TIA (transient ischemic attack)    * No surgery found * of      BY: * Surgery not found *             ON: * No surgery found *                  Code Status: Full Code                Advance Directives:   Advance Care Planning 8/6/2018   Patient's Healthcare Decision Maker is: Verbal statement (Legal Next of Kin remains as decision maker)   Primary Decision Maker Name Jon Liu   Primary Decision Maker Phone Number in chart   Primary Decision Maker Relationship to Patient Spouse   Confirm Advance Directive Yes, not on file   Does the patient have other document types -    (Send w/patient)   Not Received       Isolation:  There are currently no Active Isolations       MDRO: No current active infections    Pain Medications given:  none    Last dose: n/a    Special Equipment needed: no  Type of equipment:      (Not currently on dialysis)     BACKGROUND     Allergies:   Allergies   Allergen Reactions    Metformin Unknown (comments)       Past Medical History:   Diagnosis Date    Cancer [facility])     [facility] [address] [address]     Diabetes [facility]     Hypertension     Stroke [facility])        Past Surgical History:   Procedure Laterality Date    BREAST SURGERY PROCEDURE UNLISTED      HX GYN         Prescriptions Prior to Admission   Medication Sig    losartan (COZAAR) 100 mg tablet Take 100 mg by mouth daily.  aspirin (ASPIRIN) 325 mg tablet Take 325 mg by mouth daily. Indications: MYOCARDIAL INFARCTION PREVENTION, PREVENTION OF TRANSIENT ISCHEMIC ATTACKS    atorvastatin (LIPITOR) 20 mg tablet Take 20 mg by mouth daily. Indications: HYPERLIPIDEMIA, PREVENTION OF CEREBROVASCULAR ACCIDENT    hydrochlorothiazide (HYDRODIURIL) 25 mg tablet Take 25 mg by mouth daily. Indications: EDEMA    HYDROcodone-acetaminophen (NORCO) 7.5-325 mg per tablet Take 1 Tab by mouth every twelve (12) hours as needed for Pain. Indications: PAIN    metFORMIN (GLUCOPHAGE) 500 mg tablet Take 500 mg by mouth two (2) times daily (with meals). Indications: TYPE 2 DIABETES MELLITUS    ipratropium (ATROVENT) 0.02 % nebulizer solution 0.5 mg by Nebulization route every six (6) hours as needed for Wheezing.  albuterol (PROVENTIL VENTOLIN) 2.5 mg /3 mL (0.083 %) nebulizer solution 2.5 mg by Nebulization route every six (6) hours as needed for Wheezing.  gabapentin (NEURONTIN) 300 mg capsule Take 600 mg by mouth three (3) times daily. Indications: feet pain    fluconazole (DIFLUCAN) 200 mg tablet Take 200 mg by mouth ACB/HS.  albuterol (PROVENTIL HFA, VENTOLIN HFA, PROAIR HFA) 90 mcg/actuation inhaler Take 2 Puffs by inhalation every four (4) hours as needed for Wheezing. Indications: SOB    glimepiride (AMARYL) 4 mg tablet Take 4 mg by mouth ACB/HS.  traZODone (DESYREL) 300 mg tablet Take 300 mg by mouth nightly. Hard scripts included in transfer packet yes    Vaccinations: There is no immunization history on file for this patient.     Readmission Risks:    Known Risks: The [employer] Index tool is an evidenced based tool that has more automatic generated information. The tool looks at many different items such as the age of the patient, how many times they were admitted in the last calendar year, current length of stay in the hospital and their diagnosis. All of these items are pulled automatically from information documented in the chart from various places and will generate a score that predicts whether a patient is at low (less than 13), medium (13-20) or high (21 or greater) risk of being readmitted.         ASSESSMENT                Temp: 97.7 °F (36.5 °C) (08/09/18 0618) Pulse (Heart Rate): 94 (08/09/18 0618)     Resp Rate: 18 (08/09/18 0618)           BP: (!) 161/93 (08/09/18 0618)     O2 Sat (%): 94 % (08/09/18 0618)     Weight: 113.4 kg (250 lb)    Height: 5' 2\" (157.5 cm) (08/08/18 1710)       If above not within 1 hour of discharge:    BP:_____  P:____  R:____ T:_____ O2 Sat: ___%  O2: ______    Active Orders   Diet    DIET DIABETIC CONSISTENT CARB Regular; 2 GM NA (House Low NA)         Orientation: only aware of  place, person and situation     Active Behaviors: None                                   Active Lines/Drains:  (Peg Tube / Ramos / CL or S/L?): no    Urinary Status: Voiding, External catheter     Last BM: Last Bowel Movement Date: 08/09/18     Skin Integrity: Wound (add Wound LDA)             Mobility: Slightly limited   Weight Bearing Status: WBAT (Weight Bearing as Tolerated)                Lab Results   Component Value Date/Time    Glucose 34 (LL) 08/06/2018 10:36 AM    Hemoglobin A1c 7.2 (H) 08/06/2018 10:36 AM    INR 1.1 05/26/2016 05:30 AM    HGB 13.4 08/06/2018 09:35 AM    HGB 12.3 05/30/2016 04:55 AM        RECOMMENDATION     See After Visit Summary (AVS) for:  · Discharge instructions  · After [address]   · Special equipment needed (entered pre-discharge by Care Management)  · Medication Reconciliation    · Follow up Appointment(s)         Report given/sent by:  Grady Gonzales                    Verbal report given to: Catherine Nicholson RN  FAXED to:           Estimated discharge time:  8/9/2018 at 1300

## 2018-08-09 NOTE — PROGRESS NOTES
1920 - Assumed pt care from [facility]. Pt in bed, alert and oriented x 3 (self, place and situation). Not in any form of distress. Denies pain. Frequent use items and call bell within reach. Verbalized understanding to call for assistance. Bed locked in lowest position. Dual NIHSS completed. 0720 - Bedside and Verbal shift change report given to Megan Martinez (oncoming nurse) by Skip Alarcon RN (offgoing nurse). Report included the following information SBAR, Kardex, Intake/Output, MAR and Recent Results. Dual NIHSS completed.

## 2018-08-09 NOTE — DISCHARGE SUMMARY
Internal Medicine Discharge Summary           Patient: Raúl Aly     YOB: 1940     Age:  70 y.o.                 Admit Date: 8/6/2018     Discharge Date: 8/9/2018     LOS:  LOS: 2 day      Discharge To: SNF     Consults: None     Admission Diagnoses: Speech abnormality  TIA (transient ischemic attack)     Discharge Diagnoses:    Problem List as of 8/8/2018  Never Reviewed             Codes Class Noted - Resolved     Speech abnormality ICD-10-CM: R47.9  ICD-9-CM: 784.59   8/6/2018 - Present           Dysarthria ICD-10-CM: R47.1  ICD-9-CM: 784.51   8/6/2018 - Present           Dog bite ICD-10-CM: W54. 0XXA  ICD-9-CM: 879.8, E906.0   8/6/2018 - Present           DM [facility]diabetes mellitus) ([facility] ICD-10-CM: E11.9  ICD-9-CM: 250.00   8/6/2018 - Present           Hypoglycemia ICD-10-CM: E16.2  ICD-9-CM: 251. 2   8/6/2018 - Present           * (Principal)TIA (transient ischemic attack) ICD-10-CM: G45.9  ICD-9-CM: 435. 9   8/6/2018 - Present           Sepsis ([facility] ICD-10-CM: A41.9  ICD-9-CM: 038.9, 995.91   5/25/2016 - Present           Encephalopathy ICD-10-CM: G93.40  ICD-9-CM: 348.30   5/25/2016 - Present                  Discharge Condition:  Improved                  HPI: Raúl Aly is a 70year old female with hx of DM, HTN, previous CVA who presents to the ED after being brought in by [facility] for hypoglycemia with dysarthria and a dog bite on the right hand. Patient was found to have BS of 34 and subsequently was given D5W with improvement in BS.  Patient was recently placed on abx after dog bite by PCP. Patient apparently over the past day was some dysartheria different from her baseline. Tele neuro was consulted and recommeneded admission for TIA r/o. She notes some discomfort from dog bite to right hand. Patient is neurologically back to baseline per .  She deneis any sob, cp, fever, chills, n/v, or any other acute complaint at this time.  [facility] Course (Including Significant Findings): The patient was admitted to the hospital for further management of their presenting condition. The rest of the patient's chronic conditions were managed appropriately during their admission. They were medically stable at the time of discharge.     Patient was admitted for the above noting concern for TIA. BS improved. Patient returned to baseline though day of admission. MRI/MRA head and neck noted moderate to severe bilateral internal carotid stenosis with recommendation due to motion degradation to follow up with duplex arterial sonography. Discussion with attending regarding results of MRI/MRA and it was suspected stroke was 2/2 hypoglycemia and that patient could be discharged with follow with Vascular. X-ray was was negative for osteo in hand where she sustatined dog bite (rabie shot per patient who she knows dog). She will be discharged on augmentin. She will be discharged to SNF facility per case management.  Patient is agreeable to this.      Visit Vitals    /83 (BP 1 Location: Right arm, BP Patient Position: At rest)    Pulse (!) 105    Temp 97.5 °F (36.4 °C)    Resp 16    Ht 5' 6\" (1.676 m)    Wt 108.9 kg (240 lb)    SpO2 92%    BMI 38.74 kg/m2         Physical Exam at Discharge:  General Appearance: NAD, conversant  HENT: normocephalic/atraumatic, moist mucus membranes  Lungs: CTA with normal respiratory effort  CV: RRR, no m/r/g  Abdomen: soft, non-tender, normal bowel sounds  Extremities: right hand redness/wound  Neuro: generalized weakness  Psych: appropriate affect, alert and oriented to person, place and time     Labs Prior to Discharge:  Labs: Results:         Chemistry     Recent Labs       08/06/18   1036   GLU  34*   NA  140   K  3.2*   CL  101   CO2  30   BUN  11   CREA  0.88   CA  8.8   AGAP  9   BUCR  13       CBC w/Diff     Recent Labs       08/06/18   0935   WBC  4.6   RBC  4.05*   HGB  13.4   HCT  38.6   PLT  248   GRANS  89*   LYMPH  5*   EOS  1       Cardiac Enzymes     Recent Labs       08/06/18   1036   CPK  275*   CKND1  1.4       Coagulation No results for input(s): PTP, INR, APTT in the last 72 hours.     No lab exists for component: INREXT, INREXT    Lipid Panel       Lab Results   Component Value Date/Time     Cholesterol, total 115 08/06/2018 10:36 AM     HDL Cholesterol 22 (L) 08/06/2018 10:36 AM     LDL, calculated 55.6 08/06/2018 10:36 AM     VLDL, calculated 37.4 08/06/2018 10:36 AM     Triglyceride 187 (H) 08/06/2018 10:36 AM     CHOL/HDL Ratio 5.2 (H) 08/06/2018 10:36 AM       BNP No results for input(s): BNPP in the last 72 hours. Liver Enzymes No results for input(s): TP, ALB, TBIL, AP, SGOT, GPT in the last 72 hours.     No lab exists for component: DBIL   Thyroid Studies       Lab Results   Component Value Date/Time     TSH 0.87 08/06/2018 10:36 AM              Significant Imaging:  Xr Hand Rt Min 3 V     Result Date: 8/6/2018  Examination: Right hand 3 views. HISTORY: Right hand pain after animal bite. FINDINGS: Dorsal, oblique, and lateral projections of the right hand are performed and interpreted without comparison. There is mild radial subluxation of the thumb at the [facility] joint noted without acute malalignment on the provided images. There is no fracture. Severe basal thumb joint osteoarthritis is present along with more moderate appearing interphalangeal joint osteoarthritis of the thumb and index fingers. No retained radiopaque foreign object. Atherosclerotic vascular calcifications noted about the right wrist.      IMPRESSION: 1. Degenerative changes as described, without acute osseous abnormality or malalignment involving the right hand.     Mra Brain Wo Cont     Result Date: 8/8/2018  MR angiogram of the [place] without contrast 8/7/2018. INDICATION:   Weakness. COMPARISON:  [None]. TECHNIQUE:  3-D time-of-flight MRA of the brain was performed. MIP reconstructions were obtained. FINDINGS:   The examination is degraded by motion. Right vertebral artery dominance is noted and the left vertebral artery appears to predominantly end in the posterior inferior cerebellar artery. There is irregularity of both carotid siphons with moderate areas of stenosis, consistent with underlying atherosclerotic vascular disease. Mild areas of stenosis are suggested along the M1 segment of the right middle cerebral artery. There is no evidence of hemodynamically significant stenosis or occlusion involving the main intracranial branches. There is a 1-2 mm outpouching arising from the inferior aspect of the distal supraclinoid segment of the left internal carotid artery. This is in the anticipated location of a posterior communicating artery infundibulum but a definite branch is not identifiable arising from the apex of this vessel and, therefore, a small saccular aneurysm cannot be excluded. There are no additional areas suspicious for aneurysm formation and there is no evidence of underlying vascular malformation.      IMPRESSION: Motion degraded evaluation. 1.  Intracranial steno-occlusive disease with mild to moderate areas of stenosis, as described above. 2.  Left posterior communicating artery infundibulum versus a small saccular aneurysm, as described above. 3.  Otherwise, unremarkable evaluation without evidence of hemodynamically significant stenosis or occlusion involving the main intracranial branches. .     Mra Neck W Wo Cont     Result Date: 8/8/2018  MRA of the neck without and with contrast 8/7/2018. History: Altered mental status and weakness. Technique: 2 time-of-flight and 3-D postcontrast MRA of the cervical vasculature were performed. MIP reconstructions were obtained. No prior studies are available for comparison. Findings: The examination is degraded by motion. The origins of the innominate artery and left common carotid artery from the aortic arch are patent.   The origin of the left subclavian artery is obscured by motion with possible stenosis suggested. The origin of the right common carotid artery from the innominate artery is patent. The right common carotid artery is grossly patent throughout its course. The right carotid bifurcation is also obscured by motion with moderate to severe stenosis suggested by NASCET criteria. The right internal carotid artery is otherwise patent throughout its course. The left common carotid artery is also grossly patent throughout its course. The left carotid bifurcation is also partially obscured by motion. Severe stenosis is suggested at the origin of the left internal carotid artery by NASCET criteria. The left internal carotid arteries otherwise patent throughout its cervical course. Right vertebral artery dominance is noted. The right vertebral artery origin is partially obscured by motion but appears to be patent. The left vertebral artery is diminutive in size and the origin is obscured. The distal left vertebral artery is also obscured by venous contamination. The visualized portions of both vertebral arteries are otherwise patent. The left vertebral artery appears to predominantly and in the posterior inferior cerebellar artery. The right vertebrobasilar junction is intact.      Impression: 1. Motion degraded evaluation with partial obscuration of the carotid bifurcations, as well as the nondominant left vertebral artery origin. 2.  Moderate to severe stenosis suggested at the origin of the right internal carotid artery by NASCET criteria with severe stenosis suggested on the left. However, given the motion degradation, correlation with duplex arterial sonography is suggested for further characterization.      Mri Brain W Wo Cont     Result Date: 8/8/2018  EXAM: MRI brain without and with contrast 8/7/2018 INDICATION: Stroke. Altered mental status. Weakness. COMPARISON: CT scan of the head from 8/6/2018.  TECHNIQUE: Multiplanar multisequential images of the brain were obtained before and after the administration of 20 cc of IV Dotarem. _______________ FINDINGS: BRAIN AND POSTERIOR FOSSA: Moderate, diffuse cerebral and cerebellar atrophy are present. Areas of old infarction are noted involving the central ross, bilateral thalami, bilateral basal ganglia, and in the right frontal corona radiata white matter. There is also a moderate-sized area of old infarct in the high left posterior frontal region and there is a patchy area of old infarction along the high right frontoparietal region. There is no intracranial hemorrhage. There is no mass effect or midline shift. There are no significant additional areas of abnormal parenchymal signal.  There is no true restricted diffusion to suggest acute infarct. There is no abnormal cerebral or cerebellar enhancement. EXTRA-AXIAL SPACES AND MENINGES: There are no abnormal extra-axial fluid collections. There is no abnormal meningeal enhancement. VASCULAR: The visualized portions of the intracranial carotid and vertebrobasilar systems demonstrate patent appearing flow voids. CALVARIA: Normal. SINUSES: Clear. CRANIOCERVICAL JUNCTION: Normal. OTHER: None. _______________      IMPRESSION: 1. Atrophy and chronic small vessel changes with multiple areas of old infarction. 2.  Otherwise, unremarkable evaluation. Specifically, no acute intracranial abnormalities are identified.      Ct Head Wo Cont     Result Date: 8/6/2018  EXAM: CT head INDICATION: Lethargy, hypoglycemia. COMPARISON: CT head performed the 20/5/2016 TECHNIQUE: Axial CT imaging of the head was performed without intravenous contrast. One or more dose reduction techniques were used on this CT: automated exposure control, adjustment of the mAs and/or kVp according to patient's size, and iterative reconstruction techniques.  The specific techniques utilized on this CT exam have been documented in the patient's electronic medical record. _______________ FINDINGS: BRAIN AND POSTERIOR FOSSA: Cortical and cerebellar volume loss is redemonstrated, similar to prior examination, and within normal limits for patient age. Focal area of encephalomalacia within the left frontal lobe, unchanged. Subcortical and periventricular white matter hypoattenuation similar to prior. Ventricular size and configuration stable. Basilar cisterns remain patent. There is no intracranial hemorrhage, mass effect, or midline shift. The gray-white matter differentiation is within normal limits. EXTRA-AXIAL SPACES AND MENINGES: There are no abnormal extra-axial fluid collections. CALVARIUM: No acute osseous abnormality. SINUSES: Small amount of fluid is noted layering within the left aspect of the sphenoid sinus. Remaining imaged paranasal sinuses and mastoid air cells are clear. OTHER: Atherosclerotic vascular calcifications of the carotid siphons noted bilaterally. _______________      IMPRESSION: 1. No acute intracranial abnormality. Stable examination. 2. Subcortical and periventricular white matter hypoattenuation; unchanged and nonspecific, likely reflecting chronic ischemic microvascular change. 3. Minimal, nonspecific fluid layering within the left aspect of the sphenoid sinus.     Xr Chest Port     Result Date: 8/6/2018  Chest, single view Indication: Lethargy, hypoglycemia Comparison: 5/26/2016 Findings:  Portable upright AP view of the chest was obtained. The patient is rotated on the provided projection with associated foreshortening of the right hemithorax. Lungs are mildly underexpanded without focal pneumonic consolidation, pneumothorax, or pleural effusion. Stable cardiac size and mediastinal contours. No acute osseous abnormality.  Surgical clips project over the left axillary soft tissues.      Impression: Rotated projection of the chest and mildly underexpanded lungs without superimposed acute radiographic cardiopulmonary abnormality.               Discharge Medications:         Current Discharge Medication List         START taking these medications     Details   amoxicillin-clavulanate (AUGMENTIN) 1,000-62.5 mg per tablet Take 1 Tab by mouth every twelve (12) hours. Indications: dog bite  Qty: 14 Tab, Refills: 0           CONTINUE these medications which have NOT CHANGED     Details   losartan (COZAAR) 100 mg tablet Take 100 mg by mouth daily.       aspirin (ASPIRIN) 325 mg tablet Take 325 mg by mouth daily. Indications: MYOCARDIAL INFARCTION PREVENTION, PREVENTION OF TRANSIENT ISCHEMIC ATTACKS       atorvastatin (LIPITOR) 20 mg tablet Take 20 mg by mouth daily. Indications: HYPERLIPIDEMIA, PREVENTION OF CEREBROVASCULAR ACCIDENT       hydrochlorothiazide (HYDRODIURIL) 25 mg tablet Take 25 mg by mouth daily. Indications: EDEMA       HYDROcodone-acetaminophen (NORCO) 7.5-325 mg per tablet Take 1 Tab by mouth every twelve (12) hours as needed for Pain. Indications: PAIN       metFORMIN (GLUCOPHAGE) 500 mg tablet Take 500 mg by mouth two (2) times daily (with meals). Indications: TYPE 2 DIABETES MELLITUS       ipratropium (ATROVENT) 0.02 % nebulizer solution 0.5 mg by Nebulization route every six (6) hours as needed for Wheezing.       albuterol (PROVENTIL VENTOLIN) 2.5 mg /3 mL (0.083 %) nebulizer solution 2.5 mg by Nebulization route every six (6) hours as needed for Wheezing.       gabapentin (NEURONTIN) 300 mg capsule Take 600 mg by mouth three (3) times daily. Indications: feet pain       fluconazole (DIFLUCAN) 200 mg tablet Take 200 mg by mouth ACB/HS.     albuterol (PROVENTIL HFA, VENTOLIN HFA, PROAIR HFA) 90 mcg/actuation inhaler Take 2 Puffs by inhalation every four (4) hours as needed for Wheezing. Indications: SOB       glimepiride (AMARYL) 4 mg tablet Take 4 mg by mouth ACB/HS.        traZODone (DESYREL) 300 mg tablet Take 300 mg by mouth nightly.                 Activity: PT/OT Eval and Treat     Diet: Diabetic Diet     Wound Care: Keep wound clean and dry     Follow-up:   Please follow up with your PCP within 7 days to discuss your recent hospitalization.  Patient to arrange.            Total time spent including time spent on final examination and discharge discussion, discharge documentation and records reviewed and medication reconciliation: > 30 minutes     BILLY SalinasP-BC  Internal Medicine  Pager: [phone]

## 2018-08-09 NOTE — PROGRESS NOTES
Transportation at Discharge:  8/9/2018    Transport Company/Representative:  [employer]/ [place]  Transportation Phone number: [phone]  Reference number:  None    Estimated pick-up time:  1:00P  Destination: [place]    Method of Transport: Stretcher/ BLS    Insurance Info: [employer] [facility]  Authorization:  No auth requried    Made DC transportation arrangements at the request of [address], [phone] [facility] ext [phone]

## 2018-08-09 NOTE — PROGRESS NOTES
Problem: Falls - Risk of  Goal: *Absence of Falls  Document Efrain Fall Risk and appropriate interventions in the flowsheet. Outcome: Progressing Towards Goal  Fall Risk Interventions:  Mobility Interventions: Communicate number of staff needed for ambulation/transfer, Patient to call before getting OOB    Mentation Interventions: Adequate sleep, hydration, pain control, Door open when patient unattended    Medication Interventions: Patient to call before getting OOB, Evaluate medications/consider consulting pharmacy    Elimination Interventions: Call light in reach, Patient to call for help with toileting needs, Toileting schedule/hourly rounds    History of Falls Interventions: Consult care management for discharge planning        Problem: Pressure Injury - Risk of  Goal: *Prevention of pressure injury  Document Ganga Scale and appropriate interventions in the flowsheet.    Outcome: Progressing Towards Goal  Pressure Injury Interventions:  Sensory Interventions: Assess changes in LOC, Keep linens dry and wrinkle-free, Pressure redistribution bed/mattress (bed type)    Moisture Interventions: Absorbent underpads, Apply protective barrier, creams and emollients, Internal/External urinary devices, Moisture barrier    Activity Interventions: Increase time out of bed, Pressure redistribution bed/mattress(bed type)    Mobility Interventions: HOB 30 degrees or less, Pressure redistribution bed/mattress (bed type)    Nutrition Interventions: Document food/fluid/supplement intake    Friction and Shear Interventions: HOB 30 degrees or less

## 2018-08-09 NOTE — DISCHARGE SUMMARY
Addendum to D/C summary. No change since discharge summary created 8/8/18. Patient was awaiting authorization to SNF. She will be going to [place]. Progress note also creat 8/9/18. Patient is safe for discharge to SNF.      Keenan Mai, ACNP-BC

## 2018-08-09 NOTE — PROGRESS NOTES
Problem: Self Care Deficits Care Plan (Adult)  Goal: *Acute Goals and Plan of Care (Insert Text)  Occupational Therapy Goals  Initiated 8/7/2018 within 7 day(s). 1.  Patient will perform grooming tasks while standing with supervision for safety. 2.  Patient will perform lower body dressing with minimal assistance utilizing adaptive strategies, prn.  3.  Patient will perform functional task in standing for 8 minutes with supervision for balance and minimal verbal cues for safety. 4.  Patient will perform toilet transfers with supervision/set-up. 5.  Patient will perform all aspects of toileting with supervision/set-up. 6.  Patient will participate in upper extremity therapeutic exercise/activities with supervision/set-up for 8 minutes to increase BUE strength for functional transfers & ADLs. 7.  Patient will utilize energy conservation techniques during functional activities with minimal verbal cues. Outcome: Progressing Towards Goal  Occupational Therapy TREATMENT    Patient: Cami Perez (51 y.o. female)  Date: 8/9/2018  Diagnosis: Speech abnormality  TIA (transient ischemic attack) TIA (transient ischemic attack)       Precautions: Fall  Chart, occupational therapy assessment, plan of care, and goals were reviewed. PLOF: Assist w/BADLs    ASSESSMENT:  Pt w/flat affect requiring min encouragement for EOB/OOB activity. Pt performs UB ADL seated EOB w/increase time, set-up and SBA. Pt incontinent bladder/bowel requiring Max Assist for jax-care. Pt tolerates standing w/RW for support. Decrease dynamic standing balance requires Max Assist w/toielting ADL and clothing mgt. Pt left in chair and reinforced importance of calling for assistance. No c/o pain, 1 c/o dizziness w/initial positional change to EOB. Resolved < 15 seconds w/deep breathing. EDUCATION Pt educated on importance OOB and encouraged OOB for all meals. STROKE EDUCATION Reviewed s/s stroke and importance of calling 911.   Progression toward goals:  [x]          Improving appropriately and progressing toward goals  []          Improving slowly and progressing toward goals  []          Not making progress toward goals and plan of care will be adjusted     PLAN:  Patient continues to benefit from skilled intervention to address the above impairments. Continue treatment per established plan of care. Discharge Recommendations:  Skilled Nursing Facility  Further Equipment Recommendations for Discharge:  shower chair     SUBJECTIVE:   Patient stated eMe Aragon you coming back? Jhonathan Powers    OBJECTIVE DATA SUMMARY:       Cognitive/Behavioral Status:  Neurologic State: Alert  Orientation Level: Oriented to person, Oriented to place  Cognition: Follows commands  Safety/Judgement: Decreased insight into deficits  Functional Mobility and Transfers for ADLs:   Bed Mobility:  Supine to Sit: Minimum assistance (w/HOB raised and SRs)   Transfers:  Bed to Chair: Contact guard assistance (w/RW)   Toilet Transfer : Minimum assistance (EOB <--> BSC xfer w/RW)  Balance:  Sitting: Intact  Sitting - Static: Good (unsupported)  Sitting - Dynamic: Fair (occasional)  Standing: With support; Impaired  Standing - Static: Good  Standing - Dynamic : Fair  ADL Intervention:  Grooming  Washing Face: Supervision/set-up  Washing Hands: Supervision/set-up    Upper Body Bathing  Bathing Assistance: Stand-by assistance  Position Performed: Seated edge of bed    Lower Body Bathing  Bathing Assistance: Maximum assistance  Perineal  : Maximum assistance  Position Performed: Standing    Upper Body [address] Gown: Stand-by assistance    Lower Body Dressing Assistance  Protective Undergarmet: Maximum assistance  Leg Crossed Method Used: No  Position Performed: Seated in chair  Cues: Hank Rico  Toileting Assistance: Maximum assistance  Bowel Hygiene: Maximum assistance  Clothing Management: Maximum assistance    Pain:  Pre Treatment:0  Post Treatment:0  Pain Scale 1: Numeric (0 - 10)  Pain Intensity 1: 0    Activity Tolerance:    Good    Please refer to the flowsheet for vital signs taken during this treatment.   After treatment:   [x]  Patient left in no apparent distress sitting up in chair  []  Patient left in no apparent distress in bed  [x]  Call bell left within reach  [x]  Nursing notified  []  Caregiver present  []  Bed alarm activated    JACOB Watson  Time Calculation: 29 mins

## 2018-08-09 NOTE — DISCHARGE INSTRUCTIONS
DISCHARGE SUMMARY from Nurse    PATIENT INSTRUCTIONS:    After general anesthesia or intravenous sedation, for 24 hours or while taking prescription Narcotics:  · Limit your activities  · Do not drive and operate hazardous machinery  · Do not make important personal or business decisions  · Do  not drink alcoholic beverages  · If you have not urinated within 8 hours after discharge, please contact your surgeon on call. Report the following to your surgeon:  · Excessive pain, swelling, redness or odor of or around the surgical area  · Temperature over 100.5  · Nausea and vomiting lasting longer than 4 hours or if unable to take medications  · Any signs of decreased circulation or nerve impairment to extremity: change in color, persistent  numbness, tingling, coldness or increase pain  · Any questions    What to do at Home:  Recommended activity: Activity as tolerated, up with assistance and walker    If you experience any of the following symptoms listed under Signs and Symptoms of a Stroke or Warning Signs of a Heart Attack, please follow up with your PCP and/or call 911. *  Please give a list of your current medications to your Primary Care Provider. *  Please update this list whenever your medications are discontinued, doses are      changed, or new medications (including over-the-counter products) are added. *  Please carry medication information at all times in case of emergency situations. These are general instructions for a healthy lifestyle:    No smoking/ No tobacco products/ Avoid exposure to second hand smoke  Surgeon General's Warning:  Quitting smoking now greatly reduces serious risk to your health.     Obesity, smoking, and sedentary lifestyle greatly increases your risk for illness    A healthy diet, regular physical exercise & weight monitoring are important for maintaining a healthy lifestyle    You may be retaining fluid if you have a history of heart failure or if you experience any of the following symptoms:  Weight gain of 3 pounds or more overnight or 5 pounds in a week, increased swelling in our hands or feet or shortness of breath while lying flat in bed. Please call your doctor as soon as you notice any of these symptoms; do not wait until your next office visit. Recognize signs and symptoms of STROKE:    F-face looks uneven    A-arms unable to move or move unevenly    S-speech slurred or non-existent    T-time-call 911 as soon as signs and symptoms begin-DO NOT go       Back to bed or wait to see if you get better-TIME IS BRAIN. Warning Signs of HEART ATTACK     Call 911 if you have these symptoms:   Chest discomfort. Most heart attacks involve discomfort in the center of the chest that lasts more than a few minutes, or that goes away and comes back. It can feel like uncomfortable pressure, squeezing, fullness, or pain.  Discomfort in other areas of the upper body. Symptoms can include pain or discomfort in one or both arms, the back, neck, jaw, or stomach.  Shortness of breath with or without chest discomfort.  Other signs may include breaking out in a cold sweat, nausea, or lightheadedness. Don't wait more than five minutes to call 911 - MINUTES MATTER! Fast action can save your life. Calling 911 is almost always the fastest way to get lifesaving treatment. Emergency Medical Services staff can begin treatment when they arrive -- up to an hour sooner than if someone gets to the hospital by car. The discharge information has been reviewed with the patient. The patient verbalized understanding. Discharge medications reviewed with the patient and appropriate educational materials and side effects teaching were provided.   ___________________________________________________________________________________________________________________________________     Transient Ischemic Attack: Care Instructions  Your Care Instructions    A transient ischemic attack (TIA) is when blood flow to a part of your brain is blocked for a short time. A TIA is like a stroke but usually lasts only a few minutes. A TIA does not cause lasting brain damage. Any vision problems, slurred speech, or other symptoms usually go away in 10 to 20 minutes. But they may last for up to 24 hours. TIAs are often warning signs of a stroke. Some people who have a TIA may have a stroke in the future. A stroke can cause symptoms like those of a TIA. But a stroke causes lasting damage to your brain. You can take steps to help prevent a stroke. One thing you can do is get early treatment. If you have other new symptoms, or if your symptoms do not get better, go back to the emergency room or call your doctor right away. Getting treatment right away may prevent long-term brain damage caused by a stroke. The doctor has checked you carefully, but problems can develop later. If you notice any problems or new symptoms, get medical treatment right away. Follow-up care is a key part of your treatment and safety. Be sure to make and go to all appointments, and call your doctor if you are having problems. It's also a good idea to know your test results and keep a list of the medicines you take. How can you care for yourself at home? Medicines    · Be safe with medicines. Take your medicines exactly as prescribed. Call your doctor if you think you are having a problem with your medicine.     · If you take a blood thinner, such as aspirin, be sure you get instructions about how to take your medicine safely. Blood thinners can cause serious bleeding problems.     · Call your doctor if you are not able to take your medicines for any reason.     · Do not take any over-the-counter medicines or herbal products without talking to your doctor first.     · If you take birth control pills or hormone therapy, talk to your doctor. Ask if these treatments are right for you.    Lifestyle changes    · Do not smoke.  If you need help quitting, talk to your doctor about stop-smoking programs and medicines.     · Be active. If your doctor recommends it, get more exercise. Walking is a good choice. Bit by bit, increase the amount you walk every day. Try for at least 30 minutes on most days of the week. You also may want to swim, bike, or do other activities.     · Eat heart-healthy foods. These include fruits, vegetables, high-fiber foods, fish, and foods that are low in sodium, saturated fat, and trans fat.     · Stay at a healthy weight. Lose weight if you need to.     · Limit alcohol to 2 drinks a day for men and 1 drink a day for women.    Staying healthy    · Manage other health problems such as diabetes, high blood pressure, and high cholesterol.     · Get the flu vaccine every year. When should you call for help? Call 911 anytime you think you may need emergency care. For example, call if:    · You have new or worse symptoms of a stroke. These may include:  ¨ Sudden numbness, tingling, weakness, or loss of movement in your face, arm, or leg, especially on only one side of your body. ¨ Sudden vision changes. ¨ Sudden trouble speaking. ¨ Sudden confusion or trouble understanding simple statements. ¨ Sudden problems with walking or balance. ¨ A sudden, severe headache that is different from past headaches. Call 911 even if these symptoms go away in a few minutes.     · You feel like you are having another TIA.    Watch closely for changes in your health, and be sure to contact your doctor if you have any problems. Where can you learn more? Go to http://abena-manju.info/. Enter [phone] in the search box to learn more about \"Transient Ischemic Attack: Care Instructions. \"  Current as of: November 21, 2017  Content Version: 11.7  © 8381-8679 [employer]. Care instructions adapted under license by [employer] (which disclaims liability or warranty for this information).  If you have questions about a medical condition or this instruction, always ask your healthcare professional. [place] [address] any warranty or liability for your use of this information. Patient discharged without removing armband and transfered to another healthcare acute, sub acute , or extended care facility.   Informed of privacy risks if armband lost or stolen

## 2018-08-09 NOTE — PROGRESS NOTES
Received auth from fantasma kramer at [place]. Santy Door #[phone] start date 8/9/18-8/15/18, updates req 8/15. facility to [address] for updates. Her fax . Notified Sandi Morocho at [facility]. Contact there for updates armando foote. Notified dr Alvin Morelosutant, he asked me to notify Fartun Portillo, paged.

## 2018-08-09 NOTE — PROGRESS NOTES
Problem: Falls - Risk of  Goal: *Absence of Falls  Document Efrain Fall Risk and appropriate interventions in the flowsheet. Outcome: Progressing Towards Goal  Fall Risk Interventions:  Mobility Interventions: Communicate number of staff needed for ambulation/transfer    Mentation Interventions: Adequate sleep, hydration, pain control    Medication Interventions: Patient to call before getting OOB    Elimination Interventions: Call light in reach    History of Falls Interventions: Consult care management for discharge planning        Problem: Pressure Injury - Risk of  Goal: *Prevention of pressure injury  Document Ganga Scale and appropriate interventions in the flowsheet.    Outcome: Progressing Towards Goal  Pressure Injury Interventions:  Sensory Interventions: Assess changes in LOC    Moisture Interventions: Absorbent underpads    Activity Interventions: Pressure redistribution bed/mattress(bed type)    Mobility Interventions: Pressure redistribution bed/mattress (bed type)    Nutrition Interventions: Document food/fluid/supplement intake    Friction and Shear Interventions: HOB 30 degrees or less

## 2018-08-09 NOTE — PROGRESS NOTES
Assumed care of patient from [facility] (offgoing nurse). Patient awake in bed, no complaints at this time. Dual NIH completed. Bed locked and in lowest position with call bell in reach. [address], spoke with [facility] Going. Told Augmentin dose not in patient bin. Told that dose is ready downstairs for someone to . 1030- Patient working with OT. NAD.    1125- Attempted to call [facility] to give report. Put on hold, line got disconnected. Will call back. 1135- Report called to accepting nurse at [facility]. Will be picked up at 1300.    1305- Patient transported to [facility] via [address] [facility] transport. NAD. Patient has discharge instructions and belongings.

## 2018-08-09 NOTE — PROGRESS NOTES
Medical Progress Note      NAME: Chris Walker   :  1940  MRM:  [id number]    Date/Time: 2018  11:46 AM         Subjective:     Chris Walker is a 70year old female with hx of DM, HTN, previous CVA who presents to the ED after being brought in by [facility] for hypoglycemia with dysarthria and a dog bite on the right hand. Patient was found to have BS of 34 and subsequently was given D5W with improvement in BS. Patient was recently placed on abx after dog bite by PCP. Patient apparently over the past day was some dysartheria different from her baseline. Tele neuro was consulted and recommeneded admission for TIA r/o. She notes some discomfort from dog bite to right hand. Patient is neurologically back to baseline per . She deneis any sob, cp, fever, chills, n/v, or any other acute complaint at this time    \"oli. Can you change channel to discovery channel\"    Past Medical History reviewed and unchanged from Admission History and Physical    Review of Systems   Constitutional: Negative. Eyes: Negative. Respiratory: Negative. Cardiovascular: Negative. Gastrointestinal: Negative. Genitourinary: Negative. Musculoskeletal: Negative. Neurological: Negative. Hematological: Negative. Objective:     Looks better compared to yesterday. ECHO pending. MRI/MRA results still pending. As stated yesterday disucssion with ED noted patient at baseline who are familiar with her. Suspected 2/2 hypoglycemia. Hand looks slightly improved. When discharged can probably be placed on augmentin. I have called MRI to see if they can expedite the read. 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 I have ordered. 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  Left posterior communicating artery infundibulum versus a small saccular aneurysm, as described above. Otherwise, unremarkable evaluation without evidence of hemodynamically significant stenosis or occlusion involving the main intracranial branches. \" Pending results may need to consult vascular vs Neuro IR. May need Neurology for recommendations    8/9/18  Discharged however waiting for prior authorization for SNF. Hand looks better. Vitals:      Last 24hrs VS reviewed since prior progress note. Most recent are:    Visit Vitals    BP (!) 161/93 (BP 1 Location: Right arm, BP Patient Position: At rest)    Pulse 94    Temp 97.7 °F (36.5 °C)    Resp 18    Ht 5' 2\" (1.575 m)    Wt 113.4 kg (250 lb)    SpO2 94%    BMI 45.73 kg/m2     SpO2 Readings from Last 6 Encounters:   08/09/18 94%   05/30/16 93%          No intake or output data in the 24 hours ending 08/09/18 0839       Exam:      Physical Exam   Constitutional: No distress. HENT:   Head: Normocephalic and atraumatic. Eyes: Conjunctivae are normal. Pupils are equal, round, and reactive to light. Neck: Normal range of motion. Neck supple. No JVD present. Cardiovascular: Normal rate and regular rhythm. Exam reveals no friction rub. No murmur heard. Pulmonary/Chest: Effort normal and breath sounds normal. No stridor. No respiratory distress. She has no wheezes. She has no rales. She exhibits no tenderness. Abdominal: Soft. Bowel sounds are normal. She exhibits no distension and no mass. There is no tenderness. There is no rebound and no guarding. Musculoskeletal: She exhibits no edema or deformity. Neurological: She is alert. Residual left sided weakness   Skin: Skin is warm and dry. No rash noted. She is not diaphoretic. No pallor.    Redness to right hand         Medications:  Current Facility-Administered Medications   Medication Dose Route Frequency    folic acid (FOLVITE) tablet 1 mg  1 mg Oral DAILY    Thiamine Mononitrate (B-1) tablet 100 mg  100 mg Oral DAILY    amoxicillin-clavulanate (AUGMENTIN) 1,000-62.5 mg per tablet 1 Tab  1 Tab Oral Q12H    ondansetron (ZOFRAN) injection 4 mg  4 mg IntraVENous Q4H PRN    aspirin (ASPIRIN) tablet 325 mg  325 mg Oral DAILY    atorvastatin (LIPITOR) tablet 80 mg  80 mg Oral QHS    acetaminophen (TYLENOL) tablet 650 mg  650 mg Oral Q4H PRN    acetaminophen (TYLENOL) suppository 650 mg  650 mg Rectal Q4H PRN    senna-docusate (PERICOLACE) 8.6-50 mg per tablet 2 Tab  2 Tab Oral QHS    albuterol (PROVENTIL VENTOLIN) nebulizer solution 2.5 mg  2.5 mg Nebulization Q4H PRN    insulin lispro (HUMALOG) injection   SubCUTAneous AC&HS    glucose chewable tablet 16 g  4 Tab Oral PRN    glucagon (GLUCAGEN) injection 1 mg  1 mg IntraMUSCular PRN    dextrose (D50W) injection syrg 12.5-25 g  25-50 mL IntraVENous PRN       ______________________________________________________________________      Lab Review:     Recent Labs      08/06/18   0935   WBC  4.6   HGB  13.4   HCT  38.6   PLT  248     Recent Labs      08/06/18   1036   NA  140   K  3.2*   CL  101   CO2  30   GLU  34*   BUN  11   CREA  0.88   CA  8.8     No components found for: GLPOC  No results for input(s): PH, PCO2, PO2, HCO3, FIO2 in the last 72 hours. No results for input(s): INR in the last 72 hours.     No lab exists for component: INREXT, [place], INREXT           Assessment:     Principal Problem:    TIA (transient ischemic attack) (8/6/2018)    Active Problems:    Speech abnormality (8/6/2018)      Dysarthria (8/6/2018)      Dog bite (8/6/2018)      DM (diabetes mellitus) ([facility] [address] (8/6/2018)      Hypoglycemia (8/6/2018)           Plan:       TIA with dyasrthria  -resolved  -I suspect this is more likely 2/2 hypoglycemic episodes however given hx of CVA will need to r/o as recommended by Tele neuro  -per staff patient is essentially back at baseline as they have taken care of her before.   -asa, statin  -CT head negative  -neuro consult   -MRI/MRA, head neck  -ECHO  -neuro checks per protocol     DM II with hypoglycemia  -monitor  -tele  -D5 prn  -may need D5 gtt low dose if no goes back down     Dog Bite  -x-ray negative for osteo  -will switch zosyn to augmentin  -HD stable  -known dog per patient has had rabie shots  -afebrile  No leukcoytosis  -will do single agent for now     DVT prophaylxis  -scd/teds  -heparin sq         ___________________________________________________    Attending Physician: Octaviano Roa NP

## 2018-08-09 NOTE — PROGRESS NOTES
Transport set for 1pm to [facility] by [facility]. Pcs completed, envelope in nurses station. 1pm  Was requested time of facility. Notified pt and her , also notified of poss co pay for transport, they have no other means of transport. They verbalize understanding they will be billed for copay. Pcs completed, envelope in nurses station. Spoke to [place] he will  Do addendum . Care Management Interventions  PCP Verified by CM:  Yes  Palliative Care Criteria Met (RRAT>21 & CHF Dx)?: No  Mode of Transport at Discharge: [facility]  Transition of Care Consult (CM Consult): Discharge [facility]: Yes  Discharge Durable Medical Equipment: No  Physical Therapy Consult: Yes  Occupational Therapy Consult: Yes  Speech Therapy Consult: Yes  Current Support Network: Lives with Spouse  Confirm Follow Up Transport: Other (see comment)  Plan discussed with Pt/Family/Caregiver: Yes  Freedom of Choice Offered: Yes  Discharge Location  Discharge Placement: Skilled nursing facility, [facility]

## 2018-08-12 LAB
BACTERIA SPEC CULT: NORMAL
BACTERIA SPEC CULT: NORMAL
SERVICE CMNT-IMP: NORMAL
SERVICE CMNT-IMP: NORMAL

## 2018-08-25 ENCOUNTER — APPOINTMENT (OUTPATIENT)
Dept: GENERAL RADIOLOGY | Age: 78
DRG: 871 | End: 2018-08-25
Attending: EMERGENCY MEDICINE
Payer: MEDICARE

## 2018-08-25 ENCOUNTER — APPOINTMENT (OUTPATIENT)
Dept: GENERAL RADIOLOGY | Age: 78
End: 2018-08-25
Attending: EMERGENCY MEDICINE
Payer: SELF-PAY

## 2018-08-25 ENCOUNTER — HOSPITAL ENCOUNTER (EMERGENCY)
Age: 78
Discharge: STILL ADMITTED OR ADMINISTRATIVE | End: 2018-08-25
Attending: EMERGENCY MEDICINE
Payer: SELF-PAY

## 2018-08-25 ENCOUNTER — HOSPITAL ENCOUNTER (INPATIENT)
Age: 78
LOS: 4 days | Discharge: NURSING, REHAB OR LONG-TERM CARE | DRG: 871 | End: 2018-08-29
Attending: EMERGENCY MEDICINE | Admitting: INTERNAL MEDICINE
Payer: MEDICARE

## 2018-08-25 VITALS
RESPIRATION RATE: 25 BRPM | TEMPERATURE: 102 F | SYSTOLIC BLOOD PRESSURE: 154 MMHG | WEIGHT: 200 LBS | HEART RATE: 111 BPM | HEIGHT: 62 IN | DIASTOLIC BLOOD PRESSURE: 60 MMHG | BODY MASS INDEX: 36.8 KG/M2 | OXYGEN SATURATION: 94 %

## 2018-08-25 DIAGNOSIS — A41.9 SEVERE SEPSIS (HCC): ICD-10-CM

## 2018-08-25 DIAGNOSIS — N39.0 URINARY TRACT INFECTION WITHOUT HEMATURIA, SITE UNSPECIFIED: Primary | ICD-10-CM

## 2018-08-25 DIAGNOSIS — R65.20 SEVERE SEPSIS (HCC): ICD-10-CM

## 2018-08-25 PROBLEM — E11.65 HYPERGLYCEMIA DUE TO TYPE 2 DIABETES MELLITUS (HCC): Status: ACTIVE | Noted: 2018-08-25

## 2018-08-25 LAB
ALBUMIN SERPL-MCNC: 2.7 G/DL (ref 3.4–5)
ALBUMIN/GLOB SERPL: 0.6 {RATIO} (ref 0.8–1.7)
ALP SERPL-CCNC: 131 U/L (ref 45–117)
ALT SERPL-CCNC: 22 U/L (ref 13–56)
ANION GAP SERPL CALC-SCNC: 12 MMOL/L (ref 3–18)
APPEARANCE UR: CLEAR
AST SERPL-CCNC: 12 U/L (ref 15–37)
ATRIAL RATE: 113 BPM
BACTERIA URNS QL MICRO: ABNORMAL /HPF
BASOPHILS # BLD: 0 K/UL (ref 0–0.1)
BASOPHILS NFR BLD: 0 % (ref 0–2)
BILIRUB SERPL-MCNC: 0.4 MG/DL (ref 0.2–1)
BILIRUB UR QL: NEGATIVE
BUN SERPL-MCNC: 15 MG/DL (ref 7–18)
BUN/CREAT SERPL: 12 (ref 12–20)
CALCIUM SERPL-MCNC: 8.7 MG/DL (ref 8.5–10.1)
CALCULATED P AXIS, ECG09: 57 DEGREES
CALCULATED R AXIS, ECG10: -53 DEGREES
CALCULATED T AXIS, ECG11: 90 DEGREES
CHLORIDE SERPL-SCNC: 102 MMOL/L (ref 100–108)
CO2 SERPL-SCNC: 25 MMOL/L (ref 21–32)
COLOR UR: YELLOW
CREAT SERPL-MCNC: 1.24 MG/DL (ref 0.6–1.3)
DIAGNOSIS, 93000: NORMAL
DIFFERENTIAL METHOD BLD: ABNORMAL
EOSINOPHIL # BLD: 0 K/UL (ref 0–0.4)
EOSINOPHIL NFR BLD: 1 % (ref 0–5)
EPITH CASTS URNS QL MICRO: ABNORMAL /LPF (ref 0–5)
ERYTHROCYTE [DISTWIDTH] IN BLOOD BY AUTOMATED COUNT: 14.3 % (ref 11.6–14.5)
GLOBULIN SER CALC-MCNC: 4.3 G/DL (ref 2–4)
GLUCOSE BLD STRIP.AUTO-MCNC: 106 MG/DL (ref 70–110)
GLUCOSE BLD STRIP.AUTO-MCNC: 212 MG/DL (ref 70–110)
GLUCOSE BLD STRIP.AUTO-MCNC: 285 MG/DL (ref 70–110)
GLUCOSE BLD STRIP.AUTO-MCNC: 42 MG/DL (ref 70–110)
GLUCOSE SERPL-MCNC: 317 MG/DL (ref 74–99)
GLUCOSE UR STRIP.AUTO-MCNC: >1000 MG/DL
HCT VFR BLD AUTO: 35.2 % (ref 35–45)
HGB BLD-MCNC: 11.5 G/DL (ref 12–16)
HGB UR QL STRIP: ABNORMAL
KETONES UR QL STRIP.AUTO: ABNORMAL MG/DL
LACTATE BLD-SCNC: 2.8 MMOL/L (ref 0.4–2)
LACTATE BLD-SCNC: 2.9 MMOL/L (ref 0.4–2)
LACTATE SERPL-SCNC: 3.3 MMOL/L (ref 0.4–2)
LEUKOCYTE ESTERASE UR QL STRIP.AUTO: ABNORMAL
LYMPHOCYTES # BLD: 0.2 K/UL (ref 0.9–3.6)
LYMPHOCYTES NFR BLD: 3 % (ref 21–52)
MCH RBC QN AUTO: 31.2 PG (ref 24–34)
MCHC RBC AUTO-ENTMCNC: 32.7 G/DL (ref 31–37)
MCV RBC AUTO: 95.4 FL (ref 74–97)
MONOCYTES # BLD: 0 K/UL (ref 0.05–1.2)
MONOCYTES NFR BLD: 0 % (ref 3–10)
MUCOUS THREADS URNS QL MICRO: ABNORMAL /LPF
NEUTS SEG # BLD: 6 K/UL (ref 1.8–8)
NEUTS SEG NFR BLD: 96 % (ref 40–73)
NITRITE UR QL STRIP.AUTO: POSITIVE
P-R INTERVAL, ECG05: 166 MS
PH UR STRIP: 5 [PH] (ref 5–8)
PLATELET # BLD AUTO: 250 K/UL (ref 135–420)
PMV BLD AUTO: 10.1 FL (ref 9.2–11.8)
POTASSIUM SERPL-SCNC: 4.2 MMOL/L (ref 3.5–5.5)
PROT SERPL-MCNC: 7 G/DL (ref 6.4–8.2)
PROT UR STRIP-MCNC: NEGATIVE MG/DL
Q-T INTERVAL, ECG07: 288 MS
QRS DURATION, ECG06: 84 MS
QTC CALCULATION (BEZET), ECG08: 395 MS
RBC # BLD AUTO: 3.69 M/UL (ref 4.2–5.3)
RBC #/AREA URNS HPF: ABNORMAL /HPF (ref 0–5)
SODIUM SERPL-SCNC: 139 MMOL/L (ref 136–145)
SP GR UR REFRACTOMETRY: 1.02 (ref 1–1.03)
UROBILINOGEN UR QL STRIP.AUTO: 0.2 EU/DL (ref 0.2–1)
VENTRICULAR RATE, ECG03: 113 BPM
WBC # BLD AUTO: 6.2 K/UL (ref 4.6–13.2)
WBC URNS QL MICRO: ABNORMAL /HPF (ref 0–4)

## 2018-08-25 PROCEDURE — 83605 ASSAY OF LACTIC ACID: CPT | Performed by: INTERNAL MEDICINE

## 2018-08-25 PROCEDURE — 74011250636 HC RX REV CODE- 250/636: Performed by: NURSE PRACTITIONER

## 2018-08-25 PROCEDURE — 77030032490 HC SLV COMPR SCD KNE COVD -B

## 2018-08-25 PROCEDURE — 80053 COMPREHEN METABOLIC PANEL: CPT | Performed by: EMERGENCY MEDICINE

## 2018-08-25 PROCEDURE — 74011000258 HC RX REV CODE- 258: Performed by: HOSPITALIST

## 2018-08-25 PROCEDURE — 74011250636 HC RX REV CODE- 250/636: Performed by: INTERNAL MEDICINE

## 2018-08-25 PROCEDURE — 99283 EMERGENCY DEPT VISIT LOW MDM: CPT

## 2018-08-25 PROCEDURE — 74011000258 HC RX REV CODE- 258

## 2018-08-25 PROCEDURE — 87040 BLOOD CULTURE FOR BACTERIA: CPT | Performed by: EMERGENCY MEDICINE

## 2018-08-25 PROCEDURE — 99285 EMERGENCY DEPT VISIT HI MDM: CPT

## 2018-08-25 PROCEDURE — 74011000258 HC RX REV CODE- 258: Performed by: EMERGENCY MEDICINE

## 2018-08-25 PROCEDURE — 51702 INSERT TEMP BLADDER CATH: CPT

## 2018-08-25 PROCEDURE — 74011250637 HC RX REV CODE- 250/637: Performed by: NURSE PRACTITIONER

## 2018-08-25 PROCEDURE — 74011636637 HC RX REV CODE- 636/637

## 2018-08-25 PROCEDURE — 65270000029 HC RM PRIVATE

## 2018-08-25 PROCEDURE — 85025 COMPLETE CBC W/AUTO DIFF WBC: CPT | Performed by: EMERGENCY MEDICINE

## 2018-08-25 PROCEDURE — 81001 URINALYSIS AUTO W/SCOPE: CPT | Performed by: EMERGENCY MEDICINE

## 2018-08-25 PROCEDURE — 71045 X-RAY EXAM CHEST 1 VIEW: CPT

## 2018-08-25 PROCEDURE — 36415 COLL VENOUS BLD VENIPUNCTURE: CPT | Performed by: INTERNAL MEDICINE

## 2018-08-25 PROCEDURE — 82962 GLUCOSE BLOOD TEST: CPT

## 2018-08-25 PROCEDURE — 74011250636 HC RX REV CODE- 250/636: Performed by: EMERGENCY MEDICINE

## 2018-08-25 PROCEDURE — 77030034849

## 2018-08-25 PROCEDURE — 75810000275 HC EMERGENCY DEPT VISIT NO LEVEL OF CARE

## 2018-08-25 PROCEDURE — 77030021352 HC CBL LD SYS DISP COVD -B

## 2018-08-25 PROCEDURE — 83605 ASSAY OF LACTIC ACID: CPT

## 2018-08-25 PROCEDURE — 74011636637 HC RX REV CODE- 636/637: Performed by: NURSE PRACTITIONER

## 2018-08-25 PROCEDURE — 96365 THER/PROPH/DIAG IV INF INIT: CPT

## 2018-08-25 PROCEDURE — 96361 HYDRATE IV INFUSION ADD-ON: CPT

## 2018-08-25 PROCEDURE — 93005 ELECTROCARDIOGRAM TRACING: CPT

## 2018-08-25 RX ORDER — INSULIN LISPRO 100 [IU]/ML
INJECTION, SOLUTION INTRAVENOUS; SUBCUTANEOUS
Status: DISCONTINUED | OUTPATIENT
Start: 2018-08-25 | End: 2018-08-25

## 2018-08-25 RX ORDER — ASPIRIN 325 MG
325 TABLET ORAL DAILY
Status: DISCONTINUED | OUTPATIENT
Start: 2018-08-26 | End: 2018-08-29 | Stop reason: HOSPADM

## 2018-08-25 RX ORDER — DEXTROSE MONOHYDRATE AND SODIUM CHLORIDE 5; .9 G/100ML; G/100ML
75 INJECTION, SOLUTION INTRAVENOUS CONTINUOUS
Status: DISCONTINUED | OUTPATIENT
Start: 2018-08-25 | End: 2018-08-27

## 2018-08-25 RX ORDER — ACETAMINOPHEN 325 MG/1
650 TABLET ORAL
Status: DISCONTINUED | OUTPATIENT
Start: 2018-08-25 | End: 2018-08-29 | Stop reason: HOSPADM

## 2018-08-25 RX ORDER — SODIUM CHLORIDE 9 MG/ML
75 INJECTION, SOLUTION INTRAVENOUS CONTINUOUS
Status: DISCONTINUED | OUTPATIENT
Start: 2018-08-25 | End: 2018-08-25

## 2018-08-25 RX ORDER — HEPARIN SODIUM 5000 [USP'U]/ML
5000 INJECTION, SOLUTION INTRAVENOUS; SUBCUTANEOUS EVERY 8 HOURS
Status: DISCONTINUED | OUTPATIENT
Start: 2018-08-25 | End: 2018-08-29 | Stop reason: HOSPADM

## 2018-08-25 RX ORDER — ONDANSETRON 2 MG/ML
4 INJECTION INTRAMUSCULAR; INTRAVENOUS
Status: DISCONTINUED | OUTPATIENT
Start: 2018-08-25 | End: 2018-08-29 | Stop reason: HOSPADM

## 2018-08-25 RX ORDER — SODIUM CHLORIDE 0.9 % (FLUSH) 0.9 %
5-10 SYRINGE (ML) INJECTION AS NEEDED
Status: DISCONTINUED | OUTPATIENT
Start: 2018-08-25 | End: 2018-08-29 | Stop reason: HOSPADM

## 2018-08-25 RX ORDER — LEVOFLOXACIN 5 MG/ML
750 INJECTION, SOLUTION INTRAVENOUS EVERY 24 HOURS
Status: DISCONTINUED | OUTPATIENT
Start: 2018-08-25 | End: 2018-08-25 | Stop reason: HOSPADM

## 2018-08-25 RX ORDER — DEXTROSE 50 % IN WATER (D50W) INTRAVENOUS SYRINGE
25-50 AS NEEDED
Status: DISCONTINUED | OUTPATIENT
Start: 2018-08-25 | End: 2018-08-29 | Stop reason: HOSPADM

## 2018-08-25 RX ORDER — INSULIN LISPRO 100 [IU]/ML
INJECTION, SOLUTION INTRAVENOUS; SUBCUTANEOUS
Status: DISCONTINUED | OUTPATIENT
Start: 2018-08-26 | End: 2018-08-29 | Stop reason: HOSPADM

## 2018-08-25 RX ORDER — ATORVASTATIN CALCIUM 20 MG/1
20 TABLET, FILM COATED ORAL DAILY
Status: DISCONTINUED | OUTPATIENT
Start: 2018-08-26 | End: 2018-08-29 | Stop reason: HOSPADM

## 2018-08-25 RX ORDER — DEXTROSE MONOHYDRATE 25 G/50ML
INJECTION, SOLUTION INTRAVENOUS
Status: COMPLETED
Start: 2018-08-25 | End: 2018-08-25

## 2018-08-25 RX ORDER — TRAZODONE HYDROCHLORIDE 100 MG/1
300 TABLET ORAL
Status: DISCONTINUED | OUTPATIENT
Start: 2018-08-25 | End: 2018-08-25

## 2018-08-25 RX ORDER — LEVOFLOXACIN 5 MG/ML
750 INJECTION, SOLUTION INTRAVENOUS
Status: DISCONTINUED | OUTPATIENT
Start: 2018-08-25 | End: 2018-08-29

## 2018-08-25 RX ORDER — SODIUM CHLORIDE 0.9 % (FLUSH) 0.9 %
5-10 SYRINGE (ML) INJECTION AS NEEDED
Status: DISCONTINUED | OUTPATIENT
Start: 2018-08-25 | End: 2018-08-25 | Stop reason: HOSPADM

## 2018-08-25 RX ORDER — MAGNESIUM SULFATE 100 %
4 CRYSTALS MISCELLANEOUS AS NEEDED
Status: DISCONTINUED | OUTPATIENT
Start: 2018-08-25 | End: 2018-08-29 | Stop reason: HOSPADM

## 2018-08-25 RX ORDER — INSULIN LISPRO 100 [IU]/ML
INJECTION, SOLUTION INTRAVENOUS; SUBCUTANEOUS
Status: COMPLETED
Start: 2018-08-25 | End: 2018-08-25

## 2018-08-25 RX ORDER — GABAPENTIN 300 MG/1
600 CAPSULE ORAL 3 TIMES DAILY
Status: DISCONTINUED | OUTPATIENT
Start: 2018-08-25 | End: 2018-08-29 | Stop reason: HOSPADM

## 2018-08-25 RX ORDER — IPRATROPIUM BROMIDE AND ALBUTEROL SULFATE 2.5; .5 MG/3ML; MG/3ML
3 SOLUTION RESPIRATORY (INHALATION)
Status: DISCONTINUED | OUTPATIENT
Start: 2018-08-25 | End: 2018-08-29 | Stop reason: HOSPADM

## 2018-08-25 RX ADMIN — INSULIN LISPRO 6 UNITS: 100 INJECTION, SOLUTION INTRAVENOUS; SUBCUTANEOUS at 15:10

## 2018-08-25 RX ADMIN — SODIUM CHLORIDE 1000 ML: 900 INJECTION, SOLUTION INTRAVENOUS at 11:30

## 2018-08-25 RX ADMIN — ONDANSETRON 4 MG: 2 INJECTION, SOLUTION INTRAMUSCULAR; INTRAVENOUS at 18:25

## 2018-08-25 RX ADMIN — SODIUM CHLORIDE 503 ML: 900 INJECTION, SOLUTION INTRAVENOUS at 15:50

## 2018-08-25 RX ADMIN — HEPARIN SODIUM 5000 UNITS: 5000 INJECTION INTRAVENOUS; SUBCUTANEOUS at 23:58

## 2018-08-25 RX ADMIN — DEXTROSE MONOHYDRATE 25 G: 25 INJECTION, SOLUTION INTRAVENOUS at 20:43

## 2018-08-25 RX ADMIN — DEXTROSE AND SODIUM CHLORIDE 75 ML/HR: 5; 900 INJECTION, SOLUTION INTRAVENOUS at 23:00

## 2018-08-25 RX ADMIN — LEVOFLOXACIN 750 MG: 5 INJECTION, SOLUTION INTRAVENOUS at 12:15

## 2018-08-25 RX ADMIN — Medication 10 ML: at 14:45

## 2018-08-25 RX ADMIN — GABAPENTIN 600 MG: 300 CAPSULE ORAL at 23:58

## 2018-08-25 RX ADMIN — HEPARIN SODIUM 5000 UNITS: 5000 INJECTION INTRAVENOUS; SUBCUTANEOUS at 16:34

## 2018-08-25 RX ADMIN — INSULIN LISPRO 4 UNITS: 100 INJECTION, SOLUTION INTRAVENOUS; SUBCUTANEOUS at 17:15

## 2018-08-25 RX ADMIN — SODIUM CHLORIDE 75 ML/HR: 900 INJECTION, SOLUTION INTRAVENOUS at 14:46

## 2018-08-25 RX ADMIN — GABAPENTIN 600 MG: 300 CAPSULE ORAL at 16:34

## 2018-08-25 RX ADMIN — CEFEPIME HYDROCHLORIDE 2 G: 2 INJECTION, POWDER, FOR SOLUTION INTRAVENOUS at 15:03

## 2018-08-25 NOTE — H&P
GENERAL GENERIC H&P/CONSULT     Tiesha Cook is a 70 y.o. female with history of dementia, COPD, Cancer, DM, HTN, CVA who presents to ED for acute on chronic AMS. EMS found patient incontinent of bowel and bladder and warm along with hyperglycemia with  and temp 101. Hx is limited 2/2 patient condition. In ED patient was found to have sepsis 2/2 UTI. She will be admitted for further eval and treatment. Past Medical History:   Diagnosis Date    Cancer [facility])     Chronic obstructive pulmonary disease ([facility]     Diabetes ([facility]     Hypertension     Stroke [facility])       Past Surgical History:   Procedure Laterality Date    BREAST SURGERY PROCEDURE UNLISTED      HX GYN        Prior to Admission medications    Medication Sig Start Date End Date Taking? Authorizing Provider   amoxicillin-clavulanate (AUGMENTIN) 1,000-62.5 mg per tablet Take 1 Tab by mouth every twelve (12) hours. Indications: dog bite 8/8/18   Maria M Pro NP   losartan (COZAAR) 100 mg tablet Take 100 mg by mouth daily. Historical Provider   aspirin (ASPIRIN) 325 mg tablet Take 325 mg by mouth daily. Indications: MYOCARDIAL INFARCTION PREVENTION, PREVENTION OF TRANSIENT ISCHEMIC ATTACKS    Historical Provider   atorvastatin (LIPITOR) 20 mg tablet Take 20 mg by mouth daily. Indications: HYPERLIPIDEMIA, PREVENTION OF CEREBROVASCULAR ACCIDENT    Historical Provider   hydrochlorothiazide (HYDRODIURIL) 25 mg tablet Take 25 mg by mouth daily. Indications: EDEMA    Historical Provider   HYDROcodone-acetaminophen (NORCO) 7.5-325 mg per tablet Take 1 Tab by mouth every twelve (12) hours as needed for Pain. Indications: PAIN    Historical Provider   metFORMIN (GLUCOPHAGE) 500 mg tablet Take 500 mg by mouth two (2) times daily (with meals). Indications: TYPE 2 DIABETES MELLITUS    Historical Provider   ipratropium (ATROVENT) 0.02 % nebulizer solution 0.5 mg by Nebulization route every six (6) hours as needed for Wheezing.     Historical Provider albuterol (PROVENTIL VENTOLIN) 2.5 mg /3 mL (0.083 %) nebulizer solution 2.5 mg by Nebulization route every six (6) hours as needed for Wheezing. Historical Provider   gabapentin (NEURONTIN) 300 mg capsule Take 600 mg by mouth three (3) times daily. Indications: feet pain    Historical Provider   fluconazole (DIFLUCAN) 200 mg tablet Take 200 mg by mouth ACB/HS. Historical Provider   albuterol (PROVENTIL HFA, VENTOLIN HFA, PROAIR HFA) 90 mcg/actuation inhaler Take 2 Puffs by inhalation every four (4) hours as needed for Wheezing. Indications: SOB    Historical Provider   glimepiride (AMARYL) 4 mg tablet Take 4 mg by mouth ACB/HS. Historical Provider   traZODone (DESYREL) 300 mg tablet Take 300 mg by mouth nightly. Historical Provider     Allergies   Allergen Reactions    Metformin Unknown (comments)      Social History   Substance Use Topics    Smoking status: Current Every Day Smoker     Packs/day: 1.00     Years: 60.00    Smokeless tobacco: Never Used    Alcohol use Not on file      History reviewed. No pertinent family history.    Review of Systems   Unable to perform ROS: Acuity of condition       Objective:          Patient Vitals for the past 8 hrs:   BP Temp Pulse Resp SpO2 Height Weight   08/25/18 1145 - (!) 102.9 °F (39.4 °C) (!) 108 22 98 % - -   08/25/18 1144 - (!) 102.9 °F (39.4 °C) (!) 106 21 98 % - -   08/25/18 1143 - (!) 102.9 °F (39.4 °C) (!) 107 21 96 % - -   08/25/18 1142 - (!) 102.9 °F (39.4 °C) (!) 107 21 95 % - -   08/25/18 1141 - (!) 103 °F (39.4 °C) (!) 103 27 95 % - -   08/25/18 1140 - (!) 103 °F (39.4 °C) (!) 104 28 95 % - -   08/25/18 1139 - (!) 103 °F (39.4 °C) (!) 104 30 95 % - -   08/25/18 1138 - (!) 103.1 °F (39.5 °C) (!) 106 20 95 % - -   08/25/18 1137 - (!) 103.1 °F (39.5 °C) (!) 106 22 96 % - -   08/25/18 1136 - (!) 103.1 °F (39.5 °C) (!) 105 24 94 % - -   08/25/18 1135 - (!) 103.2 °F (39.6 °C) (!) 105 27 95 % - -   08/25/18 1134 - (!) 103.2 °F (39.6 °C) (!) 106 27 95 % - -   08/25/18 1133 - (!) 103.2 °F (39.6 °C) (!) 106 27 96 % - -   08/25/18 1132 - (!) 103.2 °F (39.6 °C) (!) 107 21 95 % - -   08/25/18 1131 - (!) 103.2 °F (39.6 °C) (!) 105 28 95 % - -   08/25/18 1130 147/71 (!) 103.2 °F (39.6 °C) (!) 106 26 96 % - -   08/25/18 1125 - (!) 103.3 °F (39.6 °C) (!) 104 21 93 % - -   08/25/18 1115 134/55 (!) 103.2 °F (39.6 °C) (!) 102 26 93 % - -   08/25/18 1110 - (!) 103.2 °F (39.6 °C) (!) 103 27 93 % - -   08/25/18 1105 154/60 (!) 103.1 °F (39.5 °C) (!) 104 25 93 % 5' 2\" (1.575 m) 113.4 kg (250 lb)   08/25/18 1100 142/57 (!) 103.1 °F (39.5 °C) (!) 106 27 93 % - -   08/25/18 1055 - (!) 103.1 °F (39.5 °C) (!) 109 28 94 % - -   08/25/18 1050 - (!) 103.1 °F (39.5 °C) (!) 114 27 95 % - -     Physical Exam   Constitutional: No distress. HENT:   Head: Normocephalic. Eyes: Pupils are equal, round, and reactive to light. Neck: Normal range of motion. No JVD present. Cardiovascular: Regular rhythm. Tachycardia present. Pulmonary/Chest: Effort normal and breath sounds normal. No stridor. No respiratory distress. She has no rales. She exhibits no tenderness. Abdominal: Soft. Bowel sounds are normal. She exhibits no distension and no mass. There is no tenderness. There is no rebound. Musculoskeletal: She exhibits no edema. Neurological:   drowsy   Skin: Skin is warm. She is not diaphoretic.         Labs:    Recent Results (from the past 24 hour(s))   URINALYSIS W/ RFLX MICROSCOPIC    Collection Time: 08/25/18 10:35 AM   Result Value Ref Range    Color YELLOW      Appearance CLEAR      Specific gravity 1.019 1.005 - 1.030      pH (UA) 5.0 5.0 - 8.0      Protein NEGATIVE  NEG mg/dL    Glucose >1000 (A) NEG mg/dL    Ketone TRACE (A) NEG mg/dL    Bilirubin NEGATIVE  NEG      Blood TRACE (A) NEG      Urobilinogen 0.2 0.2 - 1.0 EU/dL    Nitrites POSITIVE (A) NEG      Leukocyte Esterase TRACE (A) NEG     URINE MICROSCOPIC ONLY    Collection Time: 08/25/18 10:35 AM   Result Value Ref Range    WBC 4 to 10 0 - 4 /hpf    RBC 0 to 3 0 - 5 /hpf    Epithelial cells 2+ 0 - 5 /lpf    Bacteria 4+ (A) NEG /hpf    Mucus 1+ (A) NEG /lpf   METABOLIC PANEL, COMPREHENSIVE    Collection Time: 08/25/18 10:36 AM   Result Value Ref Range    Sodium 139 136 - 145 mmol/L    Potassium 4.2 3.5 - 5.5 mmol/L    Chloride 102 100 - 108 mmol/L    CO2 25 21 - 32 mmol/L    Anion gap 12 3.0 - 18 mmol/L    Glucose 317 (H) 74 - 99 mg/dL    BUN 15 7.0 - 18 MG/DL    Creatinine 1.24 0.6 - 1.3 MG/DL    BUN/Creatinine ratio 12 12 - 20      GFR est AA 51 (L) >60 ml/min/1.73m2    GFR est non-AA 42 (L) >60 ml/min/1.73m2    Calcium 8.7 8.5 - 10.1 MG/DL    Bilirubin, total 0.4 0.2 - 1.0 MG/DL    ALT (SGPT) 22 13 - 56 U/L    AST (SGOT) 12 (L) 15 - 37 U/L    Alk. phosphatase 131 (H) 45 - 117 U/L    Protein, total 7.0 6.4 - 8.2 g/dL    Albumin 2.7 (L) 3.4 - 5.0 g/dL    Globulin 4.3 (H) 2.0 - 4.0 g/dL    A-G Ratio 0.6 (L) 0.8 - 1.7     CBC WITH AUTOMATED DIFF    Collection Time: 08/25/18 10:36 AM   Result Value Ref Range    WBC 6.2 4.6 - 13.2 K/uL    RBC 3.69 (L) 4.20 - 5.30 M/uL    HGB 11.5 (L) 12.0 - 16.0 g/dL    HCT 35.2 35.0 - 45.0 %    MCV 95.4 74.0 - 97.0 FL    MCH 31.2 24.0 - 34.0 PG    MCHC 32.7 31.0 - 37.0 g/dL    RDW 14.3 11.6 - 14.5 %    PLATELET 640 870 - 546 K/uL    MPV 10.1 9.2 - 11.8 FL    NEUTROPHILS 96 (H) 40 - 73 %    LYMPHOCYTES 3 (L) 21 - 52 %    MONOCYTES 0 (L) 3 - 10 %    EOSINOPHILS 1 0 - 5 %    BASOPHILS 0 0 - 2 %    ABS. NEUTROPHILS 6.0 1.8 - 8.0 K/UL    ABS. LYMPHOCYTES 0.2 (L) 0.9 - 3.6 K/UL    ABS. MONOCYTES 0.0 (L) 0.05 - 1.2 K/UL    ABS. EOSINOPHILS 0.0 0.0 - 0.4 K/UL    ABS.  BASOPHILS 0.0 0.0 - 0.1 K/UL    DF AUTOMATED     POC LACTIC ACID    Collection Time: 08/25/18 10:38 AM   Result Value Ref Range    Lactic Acid (POC) 2.9 (HH) 0.4 - 2.0 mmol/L           Assessment:  Principal Problem:    Sepsis ([facility] (5/25/2016)    Active Problems:    DM (diabetes mellitus) ([facility] (8/6/2018)      TIA (transient ischemic attack) (8/6/2018)      UTI (urinary tract infection) (8/25/2018)      Hyperglycemia due to type 2 diabetes mellitus ([facility] (8/25/2018)        Plan:    Sepsis 2/2 UTI  -abx  -blood cultures  -BP stable  -IVF  -lactic elevated  -febrile no leukocytosiis  -urine cultures  -chest x-ray ok    Hyperglycemia with DM   -IVF  -gap ok and serum c02 ok  -corrective insulin  -hx of hypoglyemia will need to monitor    Hx of TIA  -asa, statin    Essential HTN  -hold anti-htn for today    DVT prophayxlis  -scd/teds  -heparin sq    Signed:  Jeanine Sewell NP 8/25/2018

## 2018-08-25 NOTE — IP AVS SNAPSHOT
[address] [address] 
[phone] Patient: Michael Aguiar MRN: [id number] DZV:[id number] A check brody indicates which time of day the medication should be taken. My Medications CONTINUE taking these medications Instructions Each Dose to Equal  
 Morning Noon Evening Bedtime * albuterol 2.5 mg /3 mL (0.083 %) nebulizer solution Commonly known as:  PROVENTIL VENTOLIN Your last dose was: Your next dose is:    
   
   
 2.5 mg by Nebulization route every six (6) hours as needed for Wheezing. 2.5 mg  
    
   
   
   
  
 * albuterol 90 mcg/actuation inhaler Commonly known as:  PROVENTIL HFA, VENTOLIN HFA, PROAIR HFA Your last dose was: Your next dose is: Take 2 Puffs by inhalation every four (4) hours as needed for Wheezing. Indications: SOB 2 Puff  
    
   
   
   
  
 aspirin 325 mg tablet Commonly known as:  ASPIRIN Your last dose was: Your next dose is: Take 325 mg by mouth daily. Indications: MYOCARDIAL INFARCTION PREVENTION, PREVENTION OF TRANSIENT ISCHEMIC ATTACKS  
 325 mg  
    
   
   
   
  
 atorvastatin 20 mg tablet Commonly known as:  LIPITOR Your last dose was: Your next dose is: Take 20 mg by mouth daily. Indications: HYPERLIPIDEMIA, PREVENTION OF CEREBROVASCULAR ACCIDENT 20 mg  
    
   
   
   
  
 gabapentin 300 mg capsule Commonly known as:  NEURONTIN Your last dose was: Your next dose is: Take 600 mg by mouth three (3) times daily. Indications: feet pain 600 mg  
    
   
   
   
  
 glimepiride 4 mg tablet Commonly known as:  AMARYL Your last dose was: Your next dose is: Take 4 mg by mouth ACB/HS. 4 mg  
    
   
   
   
  
 hydroCHLOROthiazide 25 mg tablet Commonly known as:  HYDRODIURIL Your last dose was: Your next dose is: Take 25 mg by mouth daily. Indications: EDEMA 25 mg HYDROcodone-acetaminophen 7.5-325 mg per tablet Commonly known as:  Jose Maria Garcia Your last dose was: Your next dose is: Take 1 Tab by mouth every twelve (12) hours as needed for Pain. Indications: PAIN  
 1 Tab  
    
   
   
   
  
 ipratropium 0.02 % Soln Commonly known as:  ATROVENT Your last dose was: Your next dose is: 0.5 mg by Nebulization route every six (6) hours as needed for Wheezing. 0.5 mg  
    
   
   
   
  
 losartan 100 mg tablet Commonly known as:  COZAAR Your last dose was: Your next dose is: Take 100 mg by mouth daily. 100 mg  
    
   
   
   
  
 metFORMIN 500 mg tablet Commonly known as:  GLUCOPHAGE Your last dose was: Your next dose is: Take 500 mg by mouth two (2) times daily (with meals). Indications: TYPE 2 DIABETES MELLITUS  
 500 mg  
    
   
   
   
  
 traZODone 300 mg tablet Commonly known as:  Jesus Wellington Your last dose was: Your next dose is: Take 300 mg by mouth nightly. 300 mg * Notice: This list has 2 medication(s) that are the same as other medications prescribed for you. Read the directions carefully, and ask your doctor or other care provider to review them with you. STOP taking these medications   
 amoxicillin-clavulanate 1,000-62.5 mg per tablet Commonly known as:  AUGMENTIN  
   
  
 fluconazole 200 mg tablet Commonly known as:  DIFLUCAN

## 2018-08-25 NOTE — ROUTINE PROCESS
Chart review for MEWS monitoring. Elevated temperature currently with downward trend. Pt being treated for sepsis. Will continue to monitor.

## 2018-08-25 NOTE — PROGRESS NOTES
Problem: Pressure Injury - Risk of  Goal: *Prevention of pressure injury  Document Ganga Scale and appropriate interventions in the flowsheet. Outcome: Progressing Towards Goal  Pressure Injury Interventions:  Sensory Interventions: Assess changes in LOC, Assess need for specialty bed, Avoid rigorous massage over bony prominences, Check visual cues for pain, Keep linens dry and wrinkle-free, Maintain/enhance activity level, Minimize linen layers, Monitor skin under medical devices, Pad between skin to skin, Pressure redistribution bed/mattress (bed type)    Moisture Interventions: Absorbent underpads, Apply protective barrier, creams and emollients, Internal/External urinary devices, Limit adult briefs, Maintain skin hydration (lotion/cream), Minimize layers, Moisture barrier    Activity Interventions: Pressure redistribution bed/mattress(bed type)    Mobility Interventions: HOB 30 degrees or less, Pressure redistribution bed/mattress (bed type)    Nutrition Interventions: Document food/fluid/supplement intake, Offer support with meals,snacks and hydration    Friction and Shear Interventions: Apply protective barrier, creams and emollients, HOB 30 degrees or less, Lift sheet, Lift team/patient mobility team, Minimize layers    Wound care consult ordered    Problem: Falls - Risk of  Goal: *Absence of Falls  Document Efrain Fall Risk and appropriate interventions in the flowsheet.   Outcome: Progressing Towards Goal  Fall Risk Interventions:  Mobility Interventions: Bed/chair exit alarm, Communicate number of staff needed for ambulation/transfer, Patient to call before getting OOB    Mentation Interventions: Adequate sleep, hydration, pain control, Bed/chair exit alarm, Door open when patient unattended, More frequent rounding, Reorient patient, Room close to nurse's station, Toileting rounds, Update white board    Medication Interventions: Patient to call before getting OOB, Teach patient to arise slowly    Elimination Interventions: Bed/chair exit alarm, Call light in reach, Patient to call for help with toileting needs, Toileting schedule/hourly rounds    Problem: Sepsis: Day of Diagnosis  Goal: Respiratory  Outcome: Not Progressing Towards Goal  Lungs coarse; wet cough

## 2018-08-25 NOTE — PROGRESS NOTES
1416  Pt arrived on floor from ER by stretcher and transferred to bed. Pt AO to self and place, disoriented to time and situation. Pt oriented to room, pt verbalizes understanding. Call light in reach, bed in low position. 46  Pt noted recent history of TIA, and TIA suspected according to ER noted. NIHSS performed: pt with score of 1 at this time for dysarthria. Pt is poor historian, unable to complete admission database. 1503  Glucose elevated on admission; contacted Dr Emigdio Gonzales, and pt medicated pt sliding scale. Pt excoriated in sacrum, groin and pannus; barrier cream applied. Suspected DTI on hips vs excoriation. Wound consult ordered. 1609  Attempted to call pt's spouse, no answer and unable to lease message    1808  Pt with undigested food emesis. Order received for PRN zofran. Pt with large amount of urine that drained around chan, pt also with BP. Chan appears to be draining. Order received to change chan if necessary; continue to monitor. 1825  Pt medicated for nausea/vomiting. Purposeful hourly rounds completed. Patient Vitals for the past 4 hrs:   Temp Pulse Resp BP SpO2   08/25/18 1704 98.3 °F (36.8 °C) (!) 101 20 123/68 98 %     Bedside and Verbal shift change report given to [facility] RN by Margret Lakhani RN. Report included the following information SBAR, Kardex, Intake/Output and MAR.

## 2018-08-25 NOTE — ED PROVIDER NOTES
EMERGENCY DEPARTMENT HISTORY AND PHYSICAL EXAM    10:32 AM      Date: 8/25/2018  Patient Name: Pretty Barroso    History of Presenting Illness     No chief complaint on file. History Provided By: EMS    Chief Complaint: TIA  Duration:  Minutes  Timing:  Acute  Severity: severe  Associated Symptoms: Sepsis, hypoglycemia and incontinence, hot to the touch      Additional History (Context): 10:34 AM Pretty Barroso is a 70 y.o. female with h/o dementia, COPD, Cancer, DM, HTN, CVA who presents to ED complaining of acute, severe, TIA onset minutes prior to arrival with associated symptoms of sepsis, hypoglycemia, hot to the touch and incontinence. EMS notes that patient had a blood sugar of 300 and temp of 101 in transit. Also notes that patients care giver (Lee Person), noticed patient had urges for water and has been less active lately. Patient denies trouble breathing or physical distress. HPI limited due to AMS. PCP: Ana María Calderon MD        Current Facility-Administered Medications   Medication Dose Route Frequency Provider Last Rate Last Dose    sodium chloride (NS) flush 5-10 mL  5-10 mL IntraVENous PRN Reyna Huerta MD         Current Outpatient Prescriptions   Medication Sig Dispense Refill    amoxicillin-clavulanate (AUGMENTIN) 1,000-62.5 mg per tablet Take 1 Tab by mouth every twelve (12) hours. Indications: dog bite 14 Tab 0    losartan (COZAAR) 100 mg tablet Take 100 mg by mouth daily.  aspirin (ASPIRIN) 325 mg tablet Take 325 mg by mouth daily. Indications: MYOCARDIAL INFARCTION PREVENTION, PREVENTION OF TRANSIENT ISCHEMIC ATTACKS      atorvastatin (LIPITOR) 20 mg tablet Take 20 mg by mouth daily. Indications: HYPERLIPIDEMIA, PREVENTION OF CEREBROVASCULAR ACCIDENT      hydrochlorothiazide (HYDRODIURIL) 25 mg tablet Take 25 mg by mouth daily.  Indications: EDEMA      HYDROcodone-acetaminophen (NORCO) 7.5-325 mg per tablet Take 1 Tab by mouth every twelve (12) hours as needed for Pain. Indications: PAIN      metFORMIN (GLUCOPHAGE) 500 mg tablet Take 500 mg by mouth two (2) times daily (with meals). Indications: TYPE 2 DIABETES MELLITUS      ipratropium (ATROVENT) 0.02 % nebulizer solution 0.5 mg by Nebulization route every six (6) hours as needed for Wheezing.  albuterol (PROVENTIL VENTOLIN) 2.5 mg /3 mL (0.083 %) nebulizer solution 2.5 mg by Nebulization route every six (6) hours as needed for Wheezing.  gabapentin (NEURONTIN) 300 mg capsule Take 600 mg by mouth three (3) times daily. Indications: feet pain      fluconazole (DIFLUCAN) 200 mg tablet Take 200 mg by mouth ACB/HS.  albuterol (PROVENTIL HFA, VENTOLIN HFA, PROAIR HFA) 90 mcg/actuation inhaler Take 2 Puffs by inhalation every four (4) hours as needed for Wheezing. Indications: SOB      glimepiride (AMARYL) 4 mg tablet Take 4 mg by mouth ACB/HS.  traZODone (DESYREL) 300 mg tablet Take 300 mg by mouth nightly. Past History     Past Medical History:  Past Medical History:   Diagnosis Date    Cancer ([facility] [address]     Chronic obstructive pulmonary disease ([facility] [address]     Diabetes ([facility] [address]     Hypertension     Stroke [facility])        Past Surgical History:  Past Surgical History:   Procedure Laterality Date    BREAST SURGERY PROCEDURE UNLISTED      HX GYN         Family History:  No family history on file. Social History:  Social History   Substance Use Topics    Smoking status: Current Every Day Smoker     Packs/day: 1.00     Years: 60.00    Smokeless tobacco: Not on file    Alcohol use Not on file       Allergies: Allergies   Allergen Reactions    Metformin Unknown (comments)         Review of Systems     Review of Systems   Unable to perform ROS: Mental status change         Physical Exam   There were no vitals taken for this visit. Physical Exam   Constitutional: She appears well-developed and well-nourished. Hot to the touch   HENT:   Head: Normocephalic and atraumatic.    Eyes: Conjunctivae are normal. No scleral icterus. Neck: Normal range of motion. Neck supple. No JVD present. Cardiovascular: Regular rhythm and intact distal pulses. Tachycardia present. Pulmonary/Chest: Effort normal. Tachypnea noted. No respiratory distress. Musculoskeletal: Normal range of motion. Neurological: She is alert. Skin: Skin is warm and dry. Psychiatric: Her mood appears anxious. Dementia   Nursing note and vitals reviewed. Physical exam limited due to AMS. Diagnostic Study Results   Labs -  Recent Results (from the past 12 hour(s))   URINALYSIS W/ RFLX MICROSCOPIC    Collection Time: 08/25/18 10:35 AM   Result Value Ref Range    Color YELLOW      Appearance CLEAR      Specific gravity 1.019 1.005 - 1.030      pH (UA) 5.0 5.0 - 8.0      Protein NEGATIVE  NEG mg/dL    Glucose >1000 (A) NEG mg/dL    Ketone TRACE (A) NEG mg/dL    Bilirubin NEGATIVE  NEG      Blood TRACE (A) NEG      Urobilinogen 0.2 0.2 - 1.0 EU/dL    Nitrites POSITIVE (A) NEG      Leukocyte Esterase TRACE (A) NEG     URINE MICROSCOPIC ONLY    Collection Time: 08/25/18 10:35 AM   Result Value Ref Range    WBC 4 to 10 0 - 4 /hpf    RBC 0 to 3 0 - 5 /hpf    Epithelial cells 2+ 0 - 5 /lpf    Bacteria 4+ (A) NEG /hpf    Mucus 1+ (A) NEG /lpf   METABOLIC PANEL, COMPREHENSIVE    Collection Time: 08/25/18 10:36 AM   Result Value Ref Range    Sodium 139 136 - 145 mmol/L    Potassium 4.2 3.5 - 5.5 mmol/L    Chloride 102 100 - 108 mmol/L    CO2 25 21 - 32 mmol/L    Anion gap 12 3.0 - 18 mmol/L    Glucose 317 (H) 74 - 99 mg/dL    BUN 15 7.0 - 18 MG/DL    Creatinine 1.24 0.6 - 1.3 MG/DL    BUN/Creatinine ratio 12 12 - 20      GFR est AA 51 (L) >60 ml/min/1.73m2    GFR est non-AA 42 (L) >60 ml/min/1.73m2    Calcium 8.7 8.5 - 10.1 MG/DL    Bilirubin, total 0.4 0.2 - 1.0 MG/DL    ALT (SGPT) 22 13 - 56 U/L    AST (SGOT) 12 (L) 15 - 37 U/L    Alk.  phosphatase 131 (H) 45 - 117 U/L    Protein, total 7.0 6.4 - 8.2 g/dL    Albumin 2.7 (L) 3.4 - 5.0 g/dL    Globulin 4.3 (H) 2.0 - 4.0 g/dL    A-G Ratio 0.6 (L) 0.8 - 1.7     CBC WITH AUTOMATED DIFF    Collection Time: 08/25/18 10:36 AM   Result Value Ref Range    WBC 6.2 4.6 - 13.2 K/uL    RBC 3.69 (L) 4.20 - 5.30 M/uL    HGB 11.5 (L) 12.0 - 16.0 g/dL    HCT 35.2 35.0 - 45.0 %    MCV 95.4 74.0 - 97.0 FL    MCH 31.2 24.0 - 34.0 PG    MCHC 32.7 31.0 - 37.0 g/dL    RDW 14.3 11.6 - 14.5 %    PLATELET 942 735 - 372 K/uL    MPV 10.1 9.2 - 11.8 FL    NEUTROPHILS 96 (H) 40 - 73 %    LYMPHOCYTES 3 (L) 21 - 52 %    MONOCYTES 0 (L) 3 - 10 %    EOSINOPHILS 1 0 - 5 %    BASOPHILS 0 0 - 2 %    ABS. NEUTROPHILS 6.0 1.8 - 8.0 K/UL    ABS. LYMPHOCYTES 0.2 (L) 0.9 - 3.6 K/UL    ABS. MONOCYTES 0.0 (L) 0.05 - 1.2 K/UL    ABS. EOSINOPHILS 0.0 0.0 - 0.4 K/UL    ABS. BASOPHILS 0.0 0.0 - 0.1 K/UL    DF AUTOMATED     POC LACTIC ACID    Collection Time: 08/25/18 10:38 AM   Result Value Ref Range    Lactic Acid (POC) 2.9 (HH) 0.4 - 2.0 mmol/L   POC LACTIC ACID    Collection Time: 08/25/18  1:57 PM   Result Value Ref Range    Lactic Acid (POC) 2.8 (HH) 0.4 - 2.0 mmol/L   GLUCOSE, POC    Collection Time: 08/25/18  2:41 PM   Result Value Ref Range    Glucose (POC) 285 (H) 70 - 110 mg/dL       Radiologic Studies -   XR CHEST PORT    (Results Pending)     No results found. Medications ordered:   Medications   sodium chloride (NS) flush 5-10 mL (not administered)         Medical Decision Making   Initial Medical Decision Making and DDx:  High concern for sepsis, code sepsis and sep initiated with antibiotics. Possibly urinary or Pulmonary in origin. ED Course: Progress Notes, Reevaluation, and Consults:  11:22 AM  Patient is obese, ideal body weight used for fluid bolus. Consult:  Discussed care with Dr. Rand Newell, Hospitalist. Standard discussion; including history of patients chief complaint, available diagnostic results, and treatment course. Agrees to see and admit patient.   11:59 AM, 8/25/2018     Repeat lactate noted, little change. I am the first provider for this patient. I reviewed the vital signs, available nursing notes, past medical history, past surgical history, family history and social history. Vital Signs-Reviewed the patient's vital signs. Cardiac Monitor:  Rate/Rhythm:      EKG: Interpreted by the EP. Time Interpreted: 10:25 AM   Rate: 113   Rhythm: Sinus Tachycardic   Interpretation: No acute process    Records Reviewed: Nursing Notes and Old Medical Records (Time of Review: 10:32 AM)        Diagnosis     Clinical Impression:   1. Urinary tract infection without hematuria, site unspecified    2. Severe sepsis ([facility] [address]        Disposition: admitted    Follow-up Information     None           Patient's Medications   Start Taking    No medications on file   Continue Taking    ALBUTEROL (PROVENTIL HFA, VENTOLIN HFA, PROAIR HFA) 90 MCG/ACTUATION INHALER    Take 2 Puffs by inhalation every four (4) hours as needed for Wheezing. Indications: SOB    ALBUTEROL (PROVENTIL VENTOLIN) 2.5 MG /3 ML (0.083 %) NEBULIZER SOLUTION    2.5 mg by Nebulization route every six (6) hours as needed for Wheezing. AMOXICILLIN-CLAVULANATE (AUGMENTIN) 1,000-62.5 MG PER TABLET    Take 1 Tab by mouth every twelve (12) hours. Indications: dog bite    ASPIRIN (ASPIRIN) 325 MG TABLET    Take 325 mg by mouth daily. Indications: MYOCARDIAL INFARCTION PREVENTION, PREVENTION OF TRANSIENT ISCHEMIC ATTACKS    ATORVASTATIN (LIPITOR) 20 MG TABLET    Take 20 mg by mouth daily. Indications: HYPERLIPIDEMIA, PREVENTION OF CEREBROVASCULAR ACCIDENT    FLUCONAZOLE (DIFLUCAN) 200 MG TABLET    Take 200 mg by mouth ACB/HS. GABAPENTIN (NEURONTIN) 300 MG CAPSULE    Take 600 mg by mouth three (3) times daily. Indications: feet pain    GLIMEPIRIDE (AMARYL) 4 MG TABLET    Take 4 mg by mouth ACB/HS. HYDROCHLOROTHIAZIDE (HYDRODIURIL) 25 MG TABLET    Take 25 mg by mouth daily.  Indications: EDEMA    HYDROCODONE-ACETAMINOPHEN (NORCO) 7.5-325 MG PER TABLET    Take 1 Tab by mouth every twelve (12) hours as needed for Pain. Indications: PAIN    IPRATROPIUM (ATROVENT) 0.02 % NEBULIZER SOLUTION    0.5 mg by Nebulization route every six (6) hours as needed for Wheezing. LOSARTAN (COZAAR) 100 MG TABLET    Take 100 mg by mouth daily. METFORMIN (GLUCOPHAGE) 500 MG TABLET    Take 500 mg by mouth two (2) times daily (with meals). Indications: TYPE 2 DIABETES MELLITUS    TRAZODONE (DESYREL) 300 MG TABLET    Take 300 mg by mouth nightly. These Medications have changed    No medications on file   Stop Taking    No medications on file     _______________________________    Attestations:  BijanNaheed Riggs Zach acting as a scribe for and in the presence of Tana Stern MD      August 25, 2018 at 10:32 AM       Provider Attestation:      I personally performed the services described in the documentation, reviewed the documentation, as recorded by the scribe in my presence, and it accurately and completely records my words and actions.  August 25, 2018 at 10:32 AM - Tana Stern MD    _______________________________

## 2018-08-25 NOTE — PROGRESS NOTES
Physical Exam   Skin: Ecchymosis noted. There is erythema.         Dual Skin assessment completed with Josselyn RN

## 2018-08-25 NOTE — IP AVS SNAPSHOT
[address] [address] 
[phone] Patient: Gabino Monet MRN: [id number] AVW:1/71/[id number] About your hospitalization You were admitted on:  August 25, 2018 You last received care in the:  [address] [place] [address] You were discharged on:  August 29, 2018 Why you were hospitalized Your primary diagnosis was:  Sepsis (Hcc) Your diagnoses also included:  Dm (Diabetes Mellitus) (Hcc), Tia (Transient Ischemic Attack), Uti (Urinary Tract Infection), Hyperglycemia Due To Type 2 Diabetes Mellitus (Hcc) Follow-up Information Follow up With Details Comments Contact Info James Rabago MD   [address] [address] [phone] [address] [address] [phone] Discharge Orders None A check brody indicates which time of day the medication should be taken. My Medications CONTINUE taking these medications Instructions Each Dose to Equal  
 Morning Noon Evening Bedtime * albuterol 2.5 mg /3 mL (0.083 %) nebulizer solution Commonly known as:  PROVENTIL VENTOLIN Your last dose was: Your next dose is:    
   
   
 2.5 mg by Nebulization route every six (6) hours as needed for Wheezing. 2.5 mg  
    
   
   
   
  
 * albuterol 90 mcg/actuation inhaler Commonly known as:  PROVENTIL HFA, VENTOLIN HFA, PROAIR HFA Your last dose was: Your next dose is: Take 2 Puffs by inhalation every four (4) hours as needed for Wheezing. Indications: SOB 2 Puff  
    
   
   
   
  
 aspirin 325 mg tablet Commonly known as:  ASPIRIN Your last dose was: Your next dose is: Take 325 mg by mouth daily. Indications: MYOCARDIAL INFARCTION PREVENTION, PREVENTION OF TRANSIENT ISCHEMIC ATTACKS  
 325 mg  
    
   
   
   
  
 atorvastatin 20 mg tablet Commonly known as:  LIPITOR Your last dose was: Your next dose is: Take 20 mg by mouth daily. Indications: HYPERLIPIDEMIA, PREVENTION OF CEREBROVASCULAR ACCIDENT 20 mg  
    
   
   
   
  
 gabapentin 300 mg capsule Commonly known as:  NEURONTIN Your last dose was: Your next dose is: Take 600 mg by mouth three (3) times daily. Indications: feet pain 600 mg  
    
   
   
   
  
 glimepiride 4 mg tablet Commonly known as:  AMARYL Your last dose was: Your next dose is: Take 4 mg by mouth ACB/HS. 4 mg  
    
   
   
   
  
 hydroCHLOROthiazide 25 mg tablet Commonly known as:  HYDRODIURIL Your last dose was: Your next dose is: Take 25 mg by mouth daily. Indications: EDEMA 25 mg HYDROcodone-acetaminophen 7.5-325 mg per tablet Commonly known as:  Barnet Cuff Your last dose was: Your next dose is: Take 1 Tab by mouth every twelve (12) hours as needed for Pain. Indications: PAIN  
 1 Tab  
    
   
   
   
  
 ipratropium 0.02 % Soln Commonly known as:  ATROVENT Your last dose was: Your next dose is: 0.5 mg by Nebulization route every six (6) hours as needed for Wheezing. 0.5 mg  
    
   
   
   
  
 losartan 100 mg tablet Commonly known as:  COZAAR Your last dose was: Your next dose is: Take 100 mg by mouth daily. 100 mg  
    
   
   
   
  
 metFORMIN 500 mg tablet Commonly known as:  GLUCOPHAGE Your last dose was: Your next dose is: Take 500 mg by mouth two (2) times daily (with meals). Indications: TYPE 2 DIABETES MELLITUS  
 500 mg  
    
   
   
   
  
 traZODone 300 mg tablet Commonly known as:  Emaline Sober Your last dose was: Your next dose is: Take 300 mg by mouth nightly.   
 300 mg  
    
   
   
   
  
 * Notice: This list has 2 medication(s) that are the same as other medications prescribed for you. Read the directions carefully, and ask your doctor or other care provider to review them with you. STOP taking these medications   
 amoxicillin-clavulanate 1,000-62.5 mg per tablet Commonly known as:  AUGMENTIN  
   
  
 fluconazole 200 mg tablet Commonly known as:  DIFLUCAN Opioid Education Prescription Opioids: What You Need to Know: 
 
 
After general anesthesia or intravenous sedation, for 24 hours or while taking prescription Narcotics: · Limit your activities · Do not drive and operate hazardous machinery · Do not make important personal or business decisions · Do  not drink alcoholic beverages · If you have not urinated within 8 hours after discharge, please contact your surgeon on call. Report the following to your surgeon: 
· Excessive pain, swelling, redness or odor of or around the surgical area · Temperature over 100.5 · Nausea and vomiting lasting longer than 4 hours or if unable to take medications · Any signs of decreased circulation or nerve impairment to extremity: change in color, persistent  numbness, tingling, coldness or increase pain · Any questions What to do at Home: 
Recommended activity: Activity as tolerated If you experience any of the following symptoms chest pain or shortness of breath, please follow up with PCP. *  Please give a list of your current medications to your Primary Care Provider.  
 
*  Please update this list whenever your medications are discontinued, doses are 
    changed, or new medications (including over-the-counter products) are added. 
 
*  Please carry medication information at all times in case of emergency situations. These are general instructions for a healthy lifestyle: No smoking/ No tobacco products/ Avoid exposure to second hand smoke Surgeon General's Warning:  Quitting smoking now greatly reduces serious risk to your health. Obesity, smoking, and sedentary lifestyle greatly increases your risk for illness A healthy diet, regular physical exercise & weight monitoring are important for maintaining a healthy lifestyle You may be retaining fluid if you have a history of heart failure or if you experience any of the following symptoms:  Weight gain of 3 pounds or more overnight or 5 pounds in a week, increased swelling in our hands or feet or shortness of breath while lying flat in bed. Please call your doctor as soon as you notice any of these symptoms; do not wait until your next office visit. Recognize signs and symptoms of STROKE: 
 
F-face looks uneven A-arms unable to move or move unevenly S-speech slurred or non-existent T-time-call 911 as soon as signs and symptoms begin-DO NOT go Back to bed or wait to see if you get better-TIME IS BRAIN. Warning Signs of HEART ATTACK Call 911 if you have these symptoms: 
? Chest discomfort. Most heart attacks involve discomfort in the center of the chest that lasts more than a few minutes, or that goes away and comes back. It can feel like uncomfortable pressure, squeezing, fullness, or pain. ? Discomfort in other areas of the upper body. Symptoms can include pain or discomfort in one or both arms, the back, neck, jaw, or stomach. ? Shortness of breath with or without chest discomfort. ? Other signs may include breaking out in a cold sweat, nausea, or lightheadedness. Don't wait more than five minutes to call [address]! Fast action can save your life.  Calling 911 is almost always the fastest way to get lifesaving treatment. Emergency Medical Services staff can begin treatment when they arrive  up to an hour sooner than if someone gets to the hospital by car. The discharge information has been reviewed with the patient. The patient verbalized understanding. Discharge medications reviewed with the patient and appropriate educational materials and side effects teaching were provided. ___________________________________________________________________________________________________________________________________ [employer] Announcement We are excited to announce that we are making your provider's discharge notes available to you in [employer]. You will see these notes when they are completed and signed by the physician that discharged you from your recent hospital stay. If you have any questions or concerns about any information you see in [employer], please call the Health Information Department where you were seen or reach out to your Primary Care Provider for more information about your plan of care. Introducing [facility] HE[facility]! Herber Singh introduces [employer] patient portal. Now you can access parts of your medical record, email your doctor's office, and request medication refills online. 1. In your internet browser, go to [email or web address] 2. Click on the First Time User? Click Here link in the Sign In box. You will see the New Member Sign Up page. 3. Enter your [employer] Access Code exactly as it appears below. You will not need to use this code after youve completed the sign-up process. If you do not sign up before the expiration date, you must request a new code. · [employer] Access Code: [email or web address] Expires: 11/4/2018  8:31 AM 
 
4. Enter the last four digits of your Social Security Number (xxxx) and Date of Birth (mm/dd/yyyy) as indicated and click Submit. You will be taken to the next sign-up page. 5. Create a [employer] ID. This will be your [employer] login ID and cannot be changed, so think of one that is secure and easy to remember. 6. Create a [employer] password. You can change your password at any time. 7. Enter your Password Reset Question and Answer. This can be used at a later time if you forget your password. 8. Enter your e-mail address. You will receive e-mail notification when new information is available in [facility] 9. Click Sign Up. You can now view and download portions of your medical record. 10. Click the Download Summary menu link to download a portable copy of your medical information. If you have questions, please visit the Frequently Asked Questions section of the [employer] website. Remember, [employer] is NOT to be used for urgent needs. For medical emergencies, dial 911. Now available from your iPhone and Android! Introducing Barry Mehta As a [facility] patient, I wanted to make you aware of our electronic visit tool called Barry Jeangayrobert. [facility] 24/7 allows you to connect within minutes with a medical provider 24 hours a day, seven days a week via a mobile device or tablet or logging into a secure website from your computer. You can access Barry Mehta from anywhere in the [place]. A virtual visit might be right for you when you have a simple condition and feel like you just dont want to get out of bed, or cant get away from work for an appointment, when your regular [facility] provider is not available (evenings, weekends or holidays), or when youre out of town and need minor care. Electronic visits cost only $49 and if the [facility] 24/7 provider determines a prescription is needed to treat your condition, one can be electronically transmitted to a nearby pharmacy*. Please take a moment to enroll today if you have not already done so. The enrollment process is free and takes just a few minutes.   To enroll, please download the [employer] 24/7 arden to your tablet or phone, or visit [email or web address] to enroll on your computer. And, as an [address] [place] [address] patient with a [employer] account, the results of your visits will be scanned into your electronic medical record and your primary care provider will be able to view the scanned results. We urge you to continue to see your regular [facility] provider for your ongoing medical care. And while your primary care provider may not be the one available when you seek a [employer] virtual visit, the peace of mind you get from getting a real diagnosis real time can be priceless. For more information on [employer], view our Frequently Asked Questions (FAQs) at [email or web address]. Sincerely, 
 
Jennifer Andrew MD 
Chief Medical Officer [facility] Kayla Skaggs *:  certain medications cannot be prescribed via [employer] Unresulted Labs-Please follow up with your PCP about these lab tests Order Current Status CULTURE, BLOOD Preliminary result CULTURE, BLOOD Preliminary result Providers Seen During Your Hospitalization Provider Specialty Primary office phone Leonardo Felix MD Emergency Medicine [phone] Marimar Hudson MD Internal Medicine [phone] Ric Colon MD Family Practice [phone] Your Primary Care Physician (PCP) Primary Care Physician Office Phone Office Fax Wanda Kathy [phone] You are allergic to the following Allergen Reactions Metformin Unknown (comments) Recent Documentation Height Weight BMI OB Status Smoking Status 1.575 m 79.9 kg 32.23 kg/m2 Hysterectomy Current Every Day Smoker Emergency Contacts Name Discharge Info Relation Home Work Mobile Salas Grullon DISCHARGE CAREGIVER [3] Spouse [3] [phone] [facility] CAREGIVER [3] Daughter [21] [phone] Patient Belongings The following personal items are in your possession at time of discharge: 
     Visual Aid: None Please provide this summary of care documentation to your next provider. Signatures-by signing, you are acknowledging that this After Visit Summary has been reviewed with you and you have received a copy. Patient Signature:  ____________________________________________________________ Date:  ____________________________________________________________  
  
[facility] Provider Signature:  ____________________________________________________________ Date:  ____________________________________________________________

## 2018-08-26 LAB
ALBUMIN SERPL-MCNC: 2.1 G/DL (ref 3.4–5)
ALBUMIN/GLOB SERPL: 0.6 {RATIO} (ref 0.8–1.7)
ALP SERPL-CCNC: 85 U/L (ref 45–117)
ALT SERPL-CCNC: 19 U/L (ref 13–56)
ANION GAP SERPL CALC-SCNC: 8 MMOL/L (ref 3–18)
AST SERPL-CCNC: 22 U/L (ref 15–37)
BILIRUB SERPL-MCNC: 0.3 MG/DL (ref 0.2–1)
BUN SERPL-MCNC: 16 MG/DL (ref 7–18)
BUN/CREAT SERPL: 21 (ref 12–20)
CALCIUM SERPL-MCNC: 7.6 MG/DL (ref 8.5–10.1)
CHLORIDE SERPL-SCNC: 106 MMOL/L (ref 100–108)
CO2 SERPL-SCNC: 25 MMOL/L (ref 21–32)
CREAT SERPL-MCNC: 0.77 MG/DL (ref 0.6–1.3)
ERYTHROCYTE [DISTWIDTH] IN BLOOD BY AUTOMATED COUNT: 14.4 % (ref 11.6–14.5)
GLOBULIN SER CALC-MCNC: 3.4 G/DL (ref 2–4)
GLUCOSE BLD STRIP.AUTO-MCNC: 108 MG/DL (ref 70–110)
GLUCOSE BLD STRIP.AUTO-MCNC: 124 MG/DL (ref 70–110)
GLUCOSE BLD STRIP.AUTO-MCNC: 142 MG/DL (ref 70–110)
GLUCOSE BLD STRIP.AUTO-MCNC: 142 MG/DL (ref 70–110)
GLUCOSE BLD STRIP.AUTO-MCNC: 40 MG/DL (ref 70–110)
GLUCOSE BLD STRIP.AUTO-MCNC: 69 MG/DL (ref 70–110)
GLUCOSE SERPL-MCNC: 41 MG/DL (ref 74–99)
HCT VFR BLD AUTO: 30.8 % (ref 35–45)
HGB BLD-MCNC: 10 G/DL (ref 12–16)
LACTATE SERPL-SCNC: 1.5 MMOL/L (ref 0.4–2)
MCH RBC QN AUTO: 30.8 PG (ref 24–34)
MCHC RBC AUTO-ENTMCNC: 32.5 G/DL (ref 31–37)
MCV RBC AUTO: 94.8 FL (ref 74–97)
PLATELET # BLD AUTO: 195 K/UL (ref 135–420)
PMV BLD AUTO: 10.4 FL (ref 9.2–11.8)
POTASSIUM SERPL-SCNC: 3.3 MMOL/L (ref 3.5–5.5)
PROT SERPL-MCNC: 5.5 G/DL (ref 6.4–8.2)
RBC # BLD AUTO: 3.25 M/UL (ref 4.2–5.3)
SODIUM SERPL-SCNC: 139 MMOL/L (ref 136–145)
WBC # BLD AUTO: 3.1 K/UL (ref 4.6–13.2)

## 2018-08-26 PROCEDURE — 80053 COMPREHEN METABOLIC PANEL: CPT | Performed by: NURSE PRACTITIONER

## 2018-08-26 PROCEDURE — 74011000258 HC RX REV CODE- 258: Performed by: NURSE PRACTITIONER

## 2018-08-26 PROCEDURE — 74011250636 HC RX REV CODE- 250/636: Performed by: EMERGENCY MEDICINE

## 2018-08-26 PROCEDURE — 82962 GLUCOSE BLOOD TEST: CPT

## 2018-08-26 PROCEDURE — 74011000258 HC RX REV CODE- 258: Performed by: EMERGENCY MEDICINE

## 2018-08-26 PROCEDURE — 36415 COLL VENOUS BLD VENIPUNCTURE: CPT | Performed by: NURSE PRACTITIONER

## 2018-08-26 PROCEDURE — 77030038269 HC DRN EXT URIN PURWCK BARD -A

## 2018-08-26 PROCEDURE — 77030010545

## 2018-08-26 PROCEDURE — 77030037878 HC DRSG MEPILEX >48IN BORD MOLN -B

## 2018-08-26 PROCEDURE — 85027 COMPLETE CBC AUTOMATED: CPT | Performed by: NURSE PRACTITIONER

## 2018-08-26 PROCEDURE — 83605 ASSAY OF LACTIC ACID: CPT | Performed by: INTERNAL MEDICINE

## 2018-08-26 PROCEDURE — 74011000258 HC RX REV CODE- 258: Performed by: HOSPITALIST

## 2018-08-26 PROCEDURE — 74011250637 HC RX REV CODE- 250/637: Performed by: NURSE PRACTITIONER

## 2018-08-26 PROCEDURE — 87086 URINE CULTURE/COLONY COUNT: CPT | Performed by: INTERNAL MEDICINE

## 2018-08-26 PROCEDURE — 65270000029 HC RM PRIVATE

## 2018-08-26 PROCEDURE — 74011250636 HC RX REV CODE- 250/636: Performed by: NURSE PRACTITIONER

## 2018-08-26 RX ORDER — DEXTROSE MONOHYDRATE 25 G/50ML
INJECTION, SOLUTION INTRAVENOUS
Status: DISPENSED
Start: 2018-08-26 | End: 2018-08-26

## 2018-08-26 RX ADMIN — ATORVASTATIN CALCIUM 20 MG: 20 TABLET, FILM COATED ORAL at 08:38

## 2018-08-26 RX ADMIN — HEPARIN SODIUM 5000 UNITS: 5000 INJECTION INTRAVENOUS; SUBCUTANEOUS at 17:29

## 2018-08-26 RX ADMIN — CEFEPIME HYDROCHLORIDE 2 G: 2 INJECTION, POWDER, FOR SOLUTION INTRAVENOUS at 00:02

## 2018-08-26 RX ADMIN — HEPARIN SODIUM 5000 UNITS: 5000 INJECTION INTRAVENOUS; SUBCUTANEOUS at 22:07

## 2018-08-26 RX ADMIN — DEXTROSE MONOHYDRATE 25 G: 25 INJECTION, SOLUTION INTRAVENOUS at 07:52

## 2018-08-26 RX ADMIN — ASPIRIN 325 MG ORAL TABLET 325 MG: 325 PILL ORAL at 08:38

## 2018-08-26 RX ADMIN — CEFEPIME HYDROCHLORIDE 2 G: 2 INJECTION, POWDER, FOR SOLUTION INTRAVENOUS at 11:18

## 2018-08-26 RX ADMIN — HEPARIN SODIUM 5000 UNITS: 5000 INJECTION INTRAVENOUS; SUBCUTANEOUS at 07:24

## 2018-08-26 RX ADMIN — GABAPENTIN 600 MG: 300 CAPSULE ORAL at 08:38

## 2018-08-26 RX ADMIN — DEXTROSE AND SODIUM CHLORIDE 75 ML/HR: 5; 900 INJECTION, SOLUTION INTRAVENOUS at 11:18

## 2018-08-26 RX ADMIN — GABAPENTIN 600 MG: 300 CAPSULE ORAL at 17:29

## 2018-08-26 RX ADMIN — GABAPENTIN 600 MG: 300 CAPSULE ORAL at 22:07

## 2018-08-26 NOTE — PROGRESS NOTES
Problem: Pressure Injury - Risk of  Goal: *Prevention of pressure injury  Document Ganga Scale and appropriate interventions in the flowsheet. Outcome: Progressing Towards Goal  Pressure Injury Interventions:  Sensory Interventions: Assess changes in LOC, Assess need for specialty bed, Check visual cues for pain, Keep linens dry and wrinkle-free, Minimize linen layers, Monitor skin under medical devices    Moisture Interventions: Absorbent underpads, Apply protective barrier, creams and emollients, Internal/External urinary devices, Minimize layers    Activity Interventions: Increase time out of bed, Pressure redistribution bed/mattress(bed type), PT/OT evaluation    Mobility Interventions: Assess need for specialty bed, HOB 30 degrees or less, Pressure redistribution bed/mattress (bed type), PT/OT evaluation    Nutrition Interventions: Document food/fluid/supplement intake, Offer support with meals,snacks and hydration    Friction and Shear Interventions: Apply protective barrier, creams and emollients, Foam dressings/transparent film/skin sealants, HOB 30 degrees or less, Lift sheet               Problem: Falls - Risk of  Goal: *Absence of Falls  Document Efrain Fall Risk and appropriate interventions in the flowsheet.    Outcome: Progressing Towards Goal  Fall Risk Interventions:  Mobility Interventions: Bed/chair exit alarm, Communicate number of staff needed for ambulation/transfer    Mentation Interventions: Adequate sleep, hydration, pain control, Bed/chair exit alarm, Door open when patient unattended, Evaluate medications/consider consulting pharmacy, More frequent rounding, Reorient patient, Room close to nurse's station, Update white board    Medication Interventions: Bed/chair exit alarm, Evaluate medications/consider consulting pharmacy    Elimination Interventions: Bed/chair exit alarm, Call light in reach, Toileting schedule/hourly rounds

## 2018-08-26 NOTE — PROGRESS NOTES
Hospitalist Progress Note  Lurdes Pate MD  Internal medicine/ Hospitalist    Daily Progress Note: 8/26/2018 9:22 AM      Interval history / Subjective:   Radha Jay a 70 y. o. female with history of dementia, COPD, Cancer, DM, HTN, CVA who presented to ED for acute on chronic AMS. EMS found patient incontinent of bowel and bladder and warm along with hyperglycemia with  and temp 101. Hx was limited 2/2 patient condition. In ED patient was found to have sepsis 2/2 UTI and admitted for treatment. She is on levaquin. Blood and Ucx pending. Pt more awake this morning saying she is fine. Current Facility-Administered Medications   Medication Dose Route Frequency    dextrose (D50) infusion        sodium chloride (NS) flush 5-10 mL  5-10 mL IntraVENous PRN    levoFLOXacin (LEVAQUIN) 750 mg in D5W IVPB  750 mg IntraVENous Q48H    cefepime (MAXIPIME) 2 g in 0.9% sodium chloride (MBP/ADV) 100 mL MBP  2 g IntraVENous Q12H    aspirin (ASPIRIN) tablet 325 mg  325 mg Oral DAILY    atorvastatin (LIPITOR) tablet 20 mg  20 mg Oral DAILY    gabapentin (NEURONTIN) capsule 600 mg  600 mg Oral TID    acetaminophen (TYLENOL) tablet 650 mg  650 mg Oral Q4H PRN    heparin (porcine) injection 5,000 Units  5,000 Units SubCUTAneous Q8H    glucose chewable tablet 16 g  4 Tab Oral PRN    glucagon (GLUCAGEN) injection 1 mg  1 mg IntraMUSCular PRN    dextrose (D50W) injection syrg 12.5-25 g  25-50 mL IntraVENous PRN    albuterol-ipratropium (DUO-NEB) 2.5 MG-0.5 MG/3 ML  3 mL Nebulization Q4H PRN    ondansetron (ZOFRAN) injection 4 mg  4 mg IntraVENous Q4H PRN    insulin lispro (HUMALOG) injection   SubCUTAneous AC&HS    dextrose 5% and 0.9% NaCl infusion  75 mL/hr IntraVENous CONTINUOUS        Review of Systems  Awake,feeling fine.     Objective:     Visit Vitals    /62 (BP 1 Location: Right arm, BP Patient Position: Head of bed elevated (Comment degrees))    Pulse 89    Temp 97.7 °F (36.5 °C)    Resp 20    Ht 5' 2\" (1.575 m)    Wt 79.9 kg (176 lb 3.2 oz)    SpO2 97%    BMI 32.23 kg/m2      O2 Device: Room air    Temp (24hrs), Av.1 °F (38.9 °C), Min:97.7 °F (36.5 °C), Max:103.3 °F (39.6 °C)          1901 -  0700  In: 3411.8 [P.O.:700; I.V.:2711.8]  Out: 260 [Urine:260]  Physical Exam   Constitutional: No distress. HEENT:at/nc,mouth moist.  Cardiovascular: Regular rhythm. S1S2+,no m/g  Pulmonary/Chest: Effort normal and breath sounds normal. No stridor. No respiratory distress. She has no rales. She exhibits no tenderness. Abdominal: Soft. Bowel sounds are normal. She exhibits no distension and no mass. There is no tenderness. There is no rebound. Musculoskeletal: She exhibits no edema. Neurological:Awake,alert,moving all extremities. Skin: Skin is warm. She is not diaphoretic. Data Review    Recent Results (from the past 12 hour(s))   METABOLIC PANEL, COMPREHENSIVE    Collection Time: 18  5:10 AM   Result Value Ref Range    Sodium 139 136 - 145 mmol/L    Potassium 3.3 (L) 3.5 - 5.5 mmol/L    Chloride 106 100 - 108 mmol/L    CO2 25 21 - 32 mmol/L    Anion gap 8 3.0 - 18 mmol/L    Glucose 41 (LL) 74 - 99 mg/dL    BUN 16 7.0 - 18 MG/DL    Creatinine 0.77 0.6 - 1.3 MG/DL    BUN/Creatinine ratio 21 (H) 12 - 20      GFR est AA >60 >60 ml/min/1.73m2    GFR est non-AA >60 >60 ml/min/1.73m2    Calcium 7.6 (L) 8.5 - 10.1 MG/DL    Bilirubin, total 0.3 0.2 - 1.0 MG/DL    ALT (SGPT) 19 13 - 56 U/L    AST (SGOT) 22 15 - 37 U/L    Alk.  phosphatase 85 45 - 117 U/L    Protein, total 5.5 (L) 6.4 - 8.2 g/dL    Albumin 2.1 (L) 3.4 - 5.0 g/dL    Globulin 3.4 2.0 - 4.0 g/dL    A-G Ratio 0.6 (L) 0.8 - 1.7     CBC W/O DIFF    Collection Time: 18  5:10 AM   Result Value Ref Range    WBC 3.1 (L) 4.6 - 13.2 K/uL    RBC 3.25 (L) 4.20 - 5.30 M/uL    HGB 10.0 (L) 12.0 - 16.0 g/dL    HCT 30.8 (L) 35.0 - 45.0 %    MCV 94.8 74.0 - 97.0 FL    MCH 30.8 24.0 - 34.0 PG    MCHC 32.5 31.0 - 37.0 g/dL    RDW 14.4 11.6 - 14.5 %    PLATELET 198 950 - 761 K/uL    MPV 10.4 9.2 - 11.8 FL   GLUCOSE, POC    Collection Time: 08/26/18  7:49 AM   Result Value Ref Range    Glucose (POC) 69 (L) 70 - 110 mg/dL   GLUCOSE, POC    Collection Time: 08/26/18  8:05 AM   Result Value Ref Range    Glucose (POC) 142 (H) 70 - 110 mg/dL         Assessment/Plan:     Principal Problem:    Sepsis ([facility] (5/25/2016)    Active Problems:    DM (diabetes mellitus) ([facility] (8/6/2018)      TIA (transient ischemic attack) (8/6/2018)      UTI (urinary tract infection) (8/25/2018)      Hyperglycemia due to type 2 diabetes mellitus ([facility] (8/25/2018)     Acute encephalopathy    FRANC. Care Plan  1. Sepsis 2/2 UTI -elevated temp,tachycardia,lactic acidosis  -abx  -blood cultures/Ucx - results pending  -IVF  -Repeat lactic acid. -WBC nl,afebrile today.  -On levaquin and cefepime  -Will follow cx results     Hyperglycemia with DM   -IVF D5 NS  -On corrective insulin  -Hypoglycemic this am on bmp 41 . Accu-check 142  -Monitor for hypoglycemia    Acute encephalopathy - metabolic on top of infection. -Mentation improved today as patient seems at her baseline. FRANC - likely due to dehydration  -Creat 1.24/GFR 42 on admission  -Improved with iv fluid. Creat 0.77/GFR >60 today     Hx of TIA  -asa, statin     Essential HTN  -hold anti-htn for today     DVT prophayxlis  -scd/teds  -heparin sq

## 2018-08-26 NOTE — PROGRESS NOTES
Chart reviewed and pt verified demographics. She lives with  and he assists her with ADL. She has a walker at home. Her  Rin Harper [phone] will drive her and participate in dc process. FYI placed for PT/OT. Reason for Admission:  sepsis                RRAT Score:     22             Resources/supports as identified by patient/family:   , daughter Brooks Perez [phone]                Top Challenges facing patient (as identified by patient/family and CM): Finances/Medication cost?   Has medicare and medicaid                 Transportation?                Support system or lack thereof?                       Living arrangements? With            Self-care/ADLs/Cognition? Current Advanced Directive/Advance Care Plan:                            Plan for utilizing home health: Maybe after snf                       Likelihood of readmission: high                 Transition of Care Plan:      Have permission to post to [place] snfs.

## 2018-08-26 NOTE — PROGRESS NOTES
Nutrition initial assessment/Plan of care      RECOMMENDATIONS:   1. Consistent CHO 2g Na Diet  2. Monitor weight, labs and PO intake  3. RD to follow     GOALS:   1. PO intake meets >75% of protein/calorie needs by 8/31  2. Weight Maintenance/gradual loss (1-2 lb/week) by 9/2    ASSESSMENT:   Wt status is classified as obese per BMI of 32.2. Fair PO intake per vitals. Labs noted. BG range () over the past 24 hours; A1C (7.2). Pt w/ hypokalemia, hypocalcemia, hypoalbminemia and hypertriglyceridemia (187). Nutrition recommendations listed. RD to follow. Nutrition Diagnoses:   Obesity related to excessive energy intake as evidenced by a BMI of 32.2 and 160% IBW. Increased nutrient needs related to wound healing as evidenced by documented pressure injuries including a DTI to L buttocks and an unstageable pressure injury to R foot. Altered nutrition related lab values related to TIA as evidenced by elevated triglyceride (187) and A1C (7.2). Nutrition Risk:  [] High  [x] Moderate []  Low    SUBJECTIVE/OBJECTIVE:    Pt admitted for UTI w/ sepsis. PMHx including cancer, DM, COPD, HTN and previous CVA. Noted nursing documented pressure injuries including a DTI to L buttocks and an unstageable pressure injury to R foot. Pt seen in room resting. Denies having any food allergies or problems chewing/swallowing and stable wt PTA; UBW 180s lb. Reports having a good appetite normally, but had declined and slowly coming back. Stated she ate a good lunch and denies N/V/ABD pain today. Expressed importance of good PO intake with a viable source of protein with each meal to promote wound healing. Encouraged intake and will monitor.      Information Obtained from:    [x] Chart Review   [x] Patient   [] Family/Caregiver   [] Nurse/Physician   [] Interdisciplinary Meeting/Rounds     Diet: Consistent CHO Diet  Medications: [x] Reviewed   IV: D5 NS @75 mL/hr   Lipitor,    Allergies: [x] Reviewed   Encounter Diagnoses     ICD-10-CM ICD-9-CM   1. Urinary tract infection without hematuria, site unspecified N39.0 599.0   2. Severe sepsis ([facility] A41.9 038.9    R65.20 995.92     Past Medical History:   Diagnosis Date    Cancer ([facility]     Chronic obstructive pulmonary disease ([facility]     Diabetes ([facility]     Hypertension     Stroke ([facility]       Labs:    Lab Results   Component Value Date/Time    Sodium 139 08/26/2018 05:10 AM    Potassium 3.3 (L) 08/26/2018 05:10 AM    Chloride 106 08/26/2018 05:10 AM    CO2 25 08/26/2018 05:10 AM    Anion gap 8 08/26/2018 05:10 AM    Glucose 41 (LL) 08/26/2018 05:10 AM    BUN 16 08/26/2018 05:10 AM    Creatinine 0.77 08/26/2018 05:10 AM    Calcium 7.6 (L) 08/26/2018 05:10 AM    Magnesium 2.0 05/26/2016 10:10 PM    Phosphorus 2.9 05/27/2016 03:40 AM    Albumin 2.1 (L) 08/26/2018 05:10 AM     Anthropometrics: BMI (calculated): 32.2  Last 3 Recorded Weights in this Encounter    08/25/18 1105 08/25/18 1506   Weight: 113.4 kg (250 lb) 79.9 kg (176 lb 3.2 oz)      Ht Readings from Last 1 Encounters:   08/25/18 5' 2\" (1.575 m)     Weight Metrics 8/25/2018 8/8/2018 5/27/2016   Weight 176 lb 3.2 oz 250 lb 224 lb 6.9 oz   BMI 32.23 kg/m2 45.73 kg/m2 42.43 kg/m2       Patient Vitals for the past 100 hrs:   % Diet Eaten   08/26/18 1423 75 %   08/26/18 0928 50 %       IBW: 110 lb %IBW: 160%   [] Weight Loss [] Weight Gain [] Weight Stable    Estimated Nutrition Needs: [x] MSJ  [] Other:  Calories: 3851-2664 kcal Based on:   [x] Actual BW    Protein:    g Based on:   [x] Actual BW    Fluid:       1901-7052 ml Based on:   [x] Actual BW      [x] No Cultural, Episcopalian or ethnic dietary need identified.     [] Cultural, Episcopalian and ethnic food preferences identified and addressed     Wt Status:  [] Normal (18.6 - 24.9) [] Underweight (<18.5) [] Overweight (25 - 29.9) [x] Mild Obesity (30 - 34.9)  [] Moderate Obesity (35 - 39.9) [] Morbid Obesity (40+)       Nutrition Problems Identified:   [] Suboptimal PO intake   [] Food Allergies  [] Difficulty chewing/swallowing/poor dentition  [] Constipation/Diarrhea   [] Nausea/Vomiting   [] None  [x] Other:Wound healing     Plan:   [x] Therapeutic Diet  []  Obtained/adjusted food preferences/tolerances and/or snacks options   []  Supplements added   [] Occupational therapy following for feeding techniques  []  HS snack added   []  Modify diet texture   []  Modify diet for food allergies   []  Educate patient   []  Assist with menu selection   [x]  Monitor PO intake on meal rounds   [x]  Continue inpatient monitoring and intervention   []  Participated in discharge planning/Interdisciplinary rounds/Team meetings   []  Other:     Education Needs:   [] Not appropriate for teaching at this time due to:   [x] Identified and addressed    Nutrition Monitoring and Evaluation:  [x] Continue ongoing monitoring and intervention  [] Other    Mushtaq Anderson

## 2018-08-26 NOTE — PROGRESS NOTES
0885: Received call from lab Critical Glucose 41    0749:  Accucheck BG 69, D50 given    0804:      838: Administered morning medications, patient tolerated well. Sitting up in bed while eating breakfast.  Bed alarm activated. 1045:  Urine specimen collected, given to  on floor. Patient resting in bed, no s/s of pain/distress noted. 1326:   at bedside, patient easily awakened. Sat patient up in bed, currently eating lunch. 1530:  Patient resting comfortably in bed, easily awakened. 1710:  , no insulin coverage needed. Bedside and Verbal shift change report given to Gio Rowe RN (oncoming nurse) by Nash Baxter RN (offgoing nurse). Report included the following information SBAR, Kardex, MAR and Recent Results.

## 2018-08-27 LAB
ALBUMIN SERPL-MCNC: 1.9 G/DL (ref 3.4–5)
ALBUMIN/GLOB SERPL: 0.6 {RATIO} (ref 0.8–1.7)
ALP SERPL-CCNC: 87 U/L (ref 45–117)
ALT SERPL-CCNC: 21 U/L (ref 13–56)
ANION GAP SERPL CALC-SCNC: 10 MMOL/L (ref 3–18)
AST SERPL-CCNC: 24 U/L (ref 15–37)
ATRIAL RATE: 113 BPM
BACTERIA SPEC CULT: NORMAL
BASOPHILS # BLD: 0 K/UL (ref 0–0.1)
BASOPHILS NFR BLD: 0 % (ref 0–2)
BILIRUB SERPL-MCNC: 0.2 MG/DL (ref 0.2–1)
BUN SERPL-MCNC: 10 MG/DL (ref 7–18)
BUN/CREAT SERPL: 15 (ref 12–20)
CALCIUM SERPL-MCNC: 7.4 MG/DL (ref 8.5–10.1)
CALCULATED P AXIS, ECG09: 57 DEGREES
CALCULATED R AXIS, ECG10: -53 DEGREES
CALCULATED T AXIS, ECG11: 90 DEGREES
CHLORIDE SERPL-SCNC: 109 MMOL/L (ref 100–108)
CO2 SERPL-SCNC: 22 MMOL/L (ref 21–32)
CREAT SERPL-MCNC: 0.66 MG/DL (ref 0.6–1.3)
DIAGNOSIS, 93000: NORMAL
DIFFERENTIAL METHOD BLD: ABNORMAL
EOSINOPHIL # BLD: 0.1 K/UL (ref 0–0.4)
EOSINOPHIL NFR BLD: 4 % (ref 0–5)
ERYTHROCYTE [DISTWIDTH] IN BLOOD BY AUTOMATED COUNT: 14.6 % (ref 11.6–14.5)
GLOBULIN SER CALC-MCNC: 3.4 G/DL (ref 2–4)
GLUCOSE BLD STRIP.AUTO-MCNC: 222 MG/DL (ref 70–110)
GLUCOSE BLD STRIP.AUTO-MCNC: 222 MG/DL (ref 70–110)
GLUCOSE BLD STRIP.AUTO-MCNC: 268 MG/DL (ref 70–110)
GLUCOSE BLD STRIP.AUTO-MCNC: 354 MG/DL (ref 70–110)
GLUCOSE SERPL-MCNC: 215 MG/DL (ref 74–99)
HCT VFR BLD AUTO: 28.8 % (ref 35–45)
HGB BLD-MCNC: 9.4 G/DL (ref 12–16)
LYMPHOCYTES # BLD: 0.8 K/UL (ref 0.9–3.6)
LYMPHOCYTES NFR BLD: 31 % (ref 21–52)
MCH RBC QN AUTO: 30.5 PG (ref 24–34)
MCHC RBC AUTO-ENTMCNC: 32.6 G/DL (ref 31–37)
MCV RBC AUTO: 93.5 FL (ref 74–97)
MONOCYTES # BLD: 0.2 K/UL (ref 0.05–1.2)
MONOCYTES NFR BLD: 7 % (ref 3–10)
NEUTS SEG # BLD: 1.4 K/UL (ref 1.8–8)
NEUTS SEG NFR BLD: 58 % (ref 40–73)
P-R INTERVAL, ECG05: 166 MS
PLATELET # BLD AUTO: 156 K/UL (ref 135–420)
PMV BLD AUTO: 10 FL (ref 9.2–11.8)
POTASSIUM SERPL-SCNC: 3.4 MMOL/L (ref 3.5–5.5)
PROT SERPL-MCNC: 5.3 G/DL (ref 6.4–8.2)
Q-T INTERVAL, ECG07: 288 MS
QRS DURATION, ECG06: 84 MS
QTC CALCULATION (BEZET), ECG08: 395 MS
RBC # BLD AUTO: 3.08 M/UL (ref 4.2–5.3)
SERVICE CMNT-IMP: NORMAL
SODIUM SERPL-SCNC: 141 MMOL/L (ref 136–145)
VENTRICULAR RATE, ECG03: 113 BPM
WBC # BLD AUTO: 2.4 K/UL (ref 4.6–13.2)

## 2018-08-27 PROCEDURE — 97166 OT EVAL MOD COMPLEX 45 MIN: CPT

## 2018-08-27 PROCEDURE — 82962 GLUCOSE BLOOD TEST: CPT

## 2018-08-27 PROCEDURE — 77030020186 HC BOOT HL PROTCT SAGE -B

## 2018-08-27 PROCEDURE — 85025 COMPLETE CBC W/AUTO DIFF WBC: CPT | Performed by: INTERNAL MEDICINE

## 2018-08-27 PROCEDURE — 74011250636 HC RX REV CODE- 250/636: Performed by: EMERGENCY MEDICINE

## 2018-08-27 PROCEDURE — 74011000258 HC RX REV CODE- 258: Performed by: EMERGENCY MEDICINE

## 2018-08-27 PROCEDURE — 97162 PT EVAL MOD COMPLEX 30 MIN: CPT

## 2018-08-27 PROCEDURE — 36415 COLL VENOUS BLD VENIPUNCTURE: CPT | Performed by: INTERNAL MEDICINE

## 2018-08-27 PROCEDURE — 65270000029 HC RM PRIVATE

## 2018-08-27 PROCEDURE — 77030038269 HC DRN EXT URIN PURWCK BARD -A

## 2018-08-27 PROCEDURE — 80053 COMPREHEN METABOLIC PANEL: CPT | Performed by: INTERNAL MEDICINE

## 2018-08-27 PROCEDURE — 74011250636 HC RX REV CODE- 250/636: Performed by: FAMILY MEDICINE

## 2018-08-27 PROCEDURE — 97530 THERAPEUTIC ACTIVITIES: CPT

## 2018-08-27 PROCEDURE — 74011000258 HC RX REV CODE- 258: Performed by: HOSPITALIST

## 2018-08-27 PROCEDURE — 74011250636 HC RX REV CODE- 250/636: Performed by: NURSE PRACTITIONER

## 2018-08-27 PROCEDURE — 74011636637 HC RX REV CODE- 636/637: Performed by: INTERNAL MEDICINE

## 2018-08-27 PROCEDURE — 74011250637 HC RX REV CODE- 250/637: Performed by: NURSE PRACTITIONER

## 2018-08-27 RX ORDER — DEXTROSE, SODIUM CHLORIDE, AND POTASSIUM CHLORIDE 5; .45; .15 G/100ML; G/100ML; G/100ML
75 INJECTION INTRAVENOUS CONTINUOUS
Status: DISCONTINUED | OUTPATIENT
Start: 2018-08-27 | End: 2018-08-28

## 2018-08-27 RX ADMIN — INSULIN LISPRO 3 UNITS: 100 INJECTION, SOLUTION INTRAVENOUS; SUBCUTANEOUS at 22:00

## 2018-08-27 RX ADMIN — CEFEPIME HYDROCHLORIDE 2 G: 2 INJECTION, POWDER, FOR SOLUTION INTRAVENOUS at 00:55

## 2018-08-27 RX ADMIN — ASPIRIN 325 MG ORAL TABLET 325 MG: 325 PILL ORAL at 10:00

## 2018-08-27 RX ADMIN — DEXTROSE AND SODIUM CHLORIDE 75 ML/HR: 5; 900 INJECTION, SOLUTION INTRAVENOUS at 00:56

## 2018-08-27 RX ADMIN — HEPARIN SODIUM 5000 UNITS: 5000 INJECTION INTRAVENOUS; SUBCUTANEOUS at 18:02

## 2018-08-27 RX ADMIN — Medication 10 ML: at 06:08

## 2018-08-27 RX ADMIN — DEXTROSE MONOHYDRATE, SODIUM CHLORIDE, AND POTASSIUM CHLORIDE 75 ML/HR: 50; 4.5; 1.49 INJECTION, SOLUTION INTRAVENOUS at 16:00

## 2018-08-27 RX ADMIN — LEVOFLOXACIN 750 MG: 5 INJECTION, SOLUTION INTRAVENOUS at 13:03

## 2018-08-27 RX ADMIN — GABAPENTIN 600 MG: 300 CAPSULE ORAL at 10:00

## 2018-08-27 RX ADMIN — ATORVASTATIN CALCIUM 20 MG: 20 TABLET, FILM COATED ORAL at 10:00

## 2018-08-27 RX ADMIN — GABAPENTIN 600 MG: 300 CAPSULE ORAL at 18:02

## 2018-08-27 RX ADMIN — CEFEPIME HYDROCHLORIDE 2 G: 2 INJECTION, POWDER, FOR SOLUTION INTRAVENOUS at 13:25

## 2018-08-27 RX ADMIN — INSULIN LISPRO 2 UNITS: 100 INJECTION, SOLUTION INTRAVENOUS; SUBCUTANEOUS at 10:06

## 2018-08-27 RX ADMIN — HEPARIN SODIUM 5000 UNITS: 5000 INJECTION INTRAVENOUS; SUBCUTANEOUS at 22:33

## 2018-08-27 RX ADMIN — INSULIN LISPRO 6 UNITS: 100 INJECTION, SOLUTION INTRAVENOUS; SUBCUTANEOUS at 17:51

## 2018-08-27 RX ADMIN — INSULIN LISPRO 2 UNITS: 100 INJECTION, SOLUTION INTRAVENOUS; SUBCUTANEOUS at 13:03

## 2018-08-27 RX ADMIN — GABAPENTIN 600 MG: 300 CAPSULE ORAL at 22:33

## 2018-08-27 RX ADMIN — HEPARIN SODIUM 5000 UNITS: 5000 INJECTION INTRAVENOUS; SUBCUTANEOUS at 06:08

## 2018-08-27 NOTE — PROGRESS NOTES
Chart reviewed. Transition plan remains [facility] when medically stable. Will cont to follow for further needs. Ruthy HarrellRN,ext [phone].

## 2018-08-27 NOTE — WOUND CARE
Wound/Ostomy Nurse Progress Note          Patient: Mayte Knott                                                                                      RPB:[birth year]    MRN: [id number]                  Situation: Wound consult for buttocks/sacrum    Background: Patient was admitted to [facility] on 8/25/18 for sepsis 2/2 UTI. She has PMHx of dementia, COPD, CA, DM, HTN, and CVA. Assessment: The patient was sitting up in bed and awake when wound care arrived for consult. She was able to understand the reason for the consult. She was cooperative with her wound care assessment and was able to turn herself from side to side. A head-to-toe skin assessment was done. There was documentation of a wound on her right plantar foot, but no evidence of any open area, except for a small callous on the heel. Her left heel does have some soreness. Her buttocks is raw and red (mainly on the left side), but the redness is blanchable. There are also some small open areas. This appears to be moisture associated skin damage. A protective Zinc ointment was applied. Prevalon boots were placed on her feet. Recommendation: Continue Pressure Injury Prevention Protocols and monitor skin for further signs of breakdown.

## 2018-08-27 NOTE — PROGRESS NOTES
Problem: Pressure Injury - Risk of  Goal: *Prevention of pressure injury  Document Ganga Scale and appropriate interventions in the flowsheet.    Outcome: Progressing Towards Goal  Pressure Injury Interventions:  Sensory Interventions: Use 30-degree side-lying position, Assess changes in LOC, Minimize linen layers, Maintain/enhance activity level    Moisture Interventions: Absorbent underpads, Apply protective barrier, creams and emollients, Internal/External urinary devices    Activity Interventions: Assess need for specialty bed, Pressure redistribution bed/mattress(bed type), PT/OT evaluation    Mobility Interventions: Float heels, HOB 30 degrees or less, Pressure redistribution bed/mattress (bed type), PT/OT evaluation    Nutrition Interventions: Document food/fluid/supplement intake, Offer support with meals,snacks and hydration    Friction and Shear Interventions: Apply protective barrier, creams and emollients, HOB 30 degrees or less

## 2018-08-27 NOTE — ROUTINE PROCESS
1920: Assumed care. Awake. HOB elevated. No sob on RA. Denies any pain or discomfort at this time. Call light within reach. 2207: Due meds given. . No coverage provided per protocol. HS snack provided. 6882: Due meds given. 0121: No change from previous assessment. 0421: No change from previous assessment. 5029: Slept good thru night. Needs attended. 0630: AM care provided. 0715: Bedside and Verbal shift change report given to Ct Lin RN (oncoming nurse) by me (offgoing nurse). Report included the following information SBAR, Kardex, Intake/Output, MAR and Recent Results.

## 2018-08-27 NOTE — INTERDISCIPLINARY ROUNDS
IDR Summary      Patient: Anita Villanueva MRN: [id number]    Age: 70 y.o.  : 1940     Admit Diagnosis: Sepsis ([address]        DIET status: diabetic     Lines/Tubes:   IV: YES   Needed: YES  Ramos: NO  Needed:NO  Central Line: NO Needed: NO      VTE Prophylaxis: Mechanical and Chemical    Mobility needs: Yes     PT ordered:  YES PT eval on chart: YES    OT ordered:  YES OT eval on chart: YES      ST ordered:  NO ST eval on chart:  NO     Disposition/Care Management:  Discharge plan: ?          Barriers to discharge:   Financial concerns:No   PCP: Estelle Koroma MD    : NO  Interventions:     PT/OT   LOS: 2 days     Expected days until discharge: TBD            Signed:     BUSHRA Moreno   [address]  Hospitalist Division  Pager:  [phone]  Office:  [phone]

## 2018-08-27 NOTE — PROGRESS NOTES
Problem: Mobility Impaired (Adult and Pediatric)  Goal: *Acute Goals and Plan of Care (Insert Text)  Physical Therapy Goals  Initiated 8/27/2018 and to be accomplished within 7 day(s)  1. Patient will move from supine to sit and sit to supine  in bed with modified independence. 2.  Patient will transfer from bed to chair and chair to bed with modified independence using the least restrictive device. 3.  Patient will perform sit to stand with modified independence. 4.  Patient will ambulate with modified independence for 50 feet with the least restrictive device. Outcome: Progressing Towards Goal  PHYSICAL THERAPY: Initial Assessment   INPATIENT: Medicare: Hospital Day: 3     Patient: Kenrick Ricardo (33 y.o. female)    Date: 8/27/2018  Primary Diagnosis: Sepsis ([facility]   Precautions: Skin  PLOF: Amb with ww    ASSESSMENT :  Patient requires between minimal assistance/contact guard assist and supervision/set-up for bed mobility, transfers and ambulation. Oriented to self. Found to be incontinent of large amount of urine; appreciate CJ Mustafa assistance. Min A for supine to sit. Good sitting balance. BLE strength as noted below. Min A for sit to stand. Cues for safe use of ww. Maintained standing 3 minutes with supervision. Completed amb 2ftx2 with ww and min A. Returned to seated EOB. Min A for sit to supine. Heels soft to touch; skin intact. Prevalon boots donned. All needs in reach. CJ Mustafa aware of session. Poor historian d/t baseline dementia. Lives with ; has elevator; amb with walker per patient. Patient presents with deficits in:  Bed Mobility, Transfers, Gait, Strength, Balance     Patient will benefit from skilled intervention to address the above impairments.   Patients rehabilitation potential is considered to be Good  Factors which may influence rehabilitation potential include:   []         None noted  [x]         Mental ability/status  [x]         Medical condition  [] Home/family situation and support systems  []         Safety awareness  []         Pain tolerance/management  []         Other:      PLAN :  Recommendations and Planned Interventions:  [x]           Bed Mobility Training             [x]    Neuromuscular Re-Education  [x]           Transfer Training                   []    Orthotic/Prosthetic Training  [x]           Gait Training                          []    Modalities  [x]           Therapeutic Exercises          []    Edema Management/Control  [x]           Therapeutic Activities            [x]    Patient and Family Training/Education  []           Other (comment):      EDUCATION:   Education:  Patient was educated on the following topics: Bed mobility, transfers, ADLs, balance, amb, safety, exercise, role of PT, plan of care, cognition, skin integrity, vitals      Barriers to Learning/Limitations: yes;  cognitive  Compensate with: visual, verbal, tactile, kinesthetic cues/model  Frequency/Duration: Patient will be followed by physical therapy 3-5 times a week to address goals. Discharge Recommendations: Rehab  Further Equipment Recommendations for Discharge: rolling walker     SUBJECTIVE:   Patient stated I use the commode.     OBJECTIVE DATA SUMMARY:     Past Medical History:   Diagnosis Date    [facility]     [facility] ([facility]     Diabetes ([facility]     Hypertension     Stroke [facility])      Past Surgical History:   Procedure Laterality Date    BREAST SURGERY PROCEDURE UNLISTED      HX GYN       Eval Complexity: History: MEDIUM  Complexity : 1-2 comorbidities / personal factors will impact the outcome/ POC Exam:MEDIUM Complexity : 3 Standardized tests and measures addressing body structure, function, activity limitation and / or participation in recreation  Presentation: MEDIUM Complexity : Evolving with changing characteristics  Clinical Decision Making:Medium Complex[employer] Balance Scale 2/5 Overall Complexity:MEDIUM    G CODES:Mobility [id number] Current  CL= 60-79%   Goal  CI= 1-19%. The severity rating is based on the Other [employer] Balance Scale 2/5    [facility] Standing Balance Scale 2/5  0: Pt performs 25% or less of standing activity (Max assist) CN, 100% impaired. 1: Pt supports self with upper extremities but requires therapist assistance. Pt performs 25-50% of effort (Mod assist) CM, 80% to <100% impaired. 1+: Pt supports self with upper extremities but requires therapist assistance. Pt performs >50% effort. (Min assist). CL, 60% to <80% impaired. 2: Pt supports self independently with both upper extremities (walker, crutches, parallel bars). CL, 60% to <80% impaired. 2+: Pt support self independently with 1 upper extremity (cane, crutch, 1 parallel bar). CK, 40% to <60% impaired. 3: Pt stands without upper extremity support for up to 30 seconds. CK, 40% to <60% impaired. 3+: Pt stands without upper extremity support for 30 seconds or greater. CJ, 20% to <40% impaired. 4: Pt independently moves and returns center of gravity 1-2 inches in one plane. CJ, 20% to <40% impaired. 4+: Pt independently moves and returns center of gravity 1-2 inches in multiple planes. CI, 1% to <20% impaired. 5: Pt independently moves and returns center of gravity in all planes greater than 2 inches. CH, 0% impaired.     Prior Level of Function/Home Situation: Amb with ww  Home Situation  Home Environment: Apartment  Living Alone: No  Support Systems: Spouse/Significant Other/Partner  Patient Expects to be Discharged to[de-identified] Private residence  Current DME Used/Available at Home: ada Garcia  Critical Behavior:  Neurologic State: Alert  Orientation Level: Oriented to person  Cognition: Follows commands  Safety/Judgement: Fall prevention  Psychosocial  Patient Behaviors: Cooperative    Manual Muscle Testing (LE)         R     L    Hip Flexion:   4/5 4/5  Knee EXT:   4/5 4/5  Knee FLEX:   4/5 4/5  Ankle DF:   4/5 4/5  _________________________________________________   Tone : BLE normal  Sensation: Intact to light touch BLE  Range Of Motion: BLE AROM WFL    Functional Mobility:      Functional Status    Indep   (I)   Mod I   Super-vision   Min A   Mod A   Max A   Total A   Assist x2 Verbal cues Additional time Not tested   Comments   Rolling []  []  [] []    []    []  []  [] [] [] [x]    Supine to sit []  []  [] [x]  []  []  []  [] [] [] []    Sit to supine []  []  [] [x]  []  []  []  [] [] [] []    Sit to stand []  []  [] [x]  []  []  []  [] [] [] []    Stand to sit []  []  [] [x]  []  []  []  [] [] [] []    Bed to chair transfers []  []  [] []  []  []  []  [] [] [] [x]        Balance    Good   Fair   Poor   Unable   Not tested   Comments   Sitting static [x]  []  []  []  []    Sitting dynamic [x]  []  []  []  []    Standing static []  [x]  []  []  []    Standing dynamic []  [x]  []  []  []        Mobility/Gait:   Level of Assistance: Minimum assistance  Assistive Device: rolling walker  Distance Ambulated: 4 feet       Pain:  Pre treatment pain level: 0  Post treatment pain level: 0  Pain Scale 1: Numeric (0 - 10)  Pain Intensity 1: 0    Activity Tolerance:   Fair  Please refer to the flowsheet for vital signs taken during this treatment. After treatment:   []         Patient left in no apparent distress sitting up in chair  [x]         Patient left in no apparent distress in bed  [x]         Call bell left within reach  [x]         Nursing notified  []         Caregiver present  []         Bed alarm activated    COMMUNICATION/EDUCATION:   [x]         Fall prevention education was provided and the patient/caregiver indicated understanding. [x]         Patient/family have participated as able in goal setting and plan of care. [x]         Patient/family agree to work toward stated goals and plan of care.   []         Patient understands intent and goals of therapy, but is neutral about his/her participation. []         Patient is unable to participate in goal setting and plan of care.     Thank you for this referral.  Leidy Landon, PT   Time Calculation: 20 mins

## 2018-08-27 NOTE — PROGRESS NOTES
Problem: Falls - Risk of  Goal: *Absence of Falls  Document Efrain Fall Risk and appropriate interventions in the flowsheet.    Outcome: Progressing Towards Goal  Fall Risk Interventions:  Mobility Interventions: Bed/chair exit alarm, PT Consult for mobility concerns    Mentation Interventions: Door open when patient unattended, Room close to nurse's station    Medication Interventions: Bed/chair exit alarm    Elimination Interventions: Call light in reach, Toilet paper/wipes in reach, Toileting schedule/hourly rounds

## 2018-08-27 NOTE — PROGRESS NOTES
Problem: Self Care Deficits Care Plan (Adult)  Goal: *Acute Goals and Plan of Care (Insert Text)  Occupational Therapy Goals  Initiated 8/27/2018 within 7 day(s). 1.  Patient will perform grooming tasks while standing with supervision/set-up. 2.  Patient will perform lower body dressing with minimal assistance and min vc's for activity pacing. 3.  Patient will perform functional task in standing for 8 minutes with supervision for safety to increase activity tolerance for ADLs. 4.  Patient will perform toilet transfers with supervision. 5.  Patient will perform all aspects of toileting with supervision. 6.  Patient will participate in upper extremity therapeutic exercise/activities for 8 minutes with supervision to maintain BUE strength for functional transfers and ADLs. 7.  Patient will utilize energy conservation techniques during functional activities with minimal verbal cues. Outcome: Progressing Towards Goal  [facility]  Occupational THERAPY: Initial Assessment   INPATIENT: Medicare: Hospital Day: 3     Patient: Pa Quezada (54 y.o. female)    Date: 8/27/2018  Primary Diagnosis: Sepsis ([address]    ,  ,   Precautions: Falls  PLOF: Pt reports independence with ADLs; utilizes RW for functional mobility. Spouse performs IADLs. ASSESSMENT:   Based on the objective data described below, the patient presents with impairments with regard to bed mobility, activity tolerance and independence in ADLs. Pt supine on arrival, alert to self & place; confused due to dementia. Pt agreeable to therapy, no c/o pain. SBA/additional time for supine-->sit. Good sitting balance. Max A to manage bilateral socks. Generally decreased BUE strength. Pt reports independence with ADLs PTA. CGA/min A x2 to  prep for [facility] transfer; fair-/poor standing balance with impulsivity as evident by attempting to return to seated position without notifying therapist. Supervision for sit-->supine.  Reba Curl in place due to incontinence. Pt left with needs within reach. Recommend short rehab stay upon d/c. Recommendations for the next treatment session: ADLs at sink  Ms. Jenny Ovalles will benefit from skilled intervention to address the above impairments. Her rehabilitation potential is considered to be Good. EDUCATION     Education:  Patient was educated on the following topics: role of OT and POC    Barriers to Learning/Limitations: yes;  cognitive  Compensate with: visual, verbal, tactile, kinesthetic cues/model     PLAN OF CARE:  Problems:  Decreased ROM, Decreased strength affecting function, Decreased ADL/functioning of activities and Decreased transfer abilities    Recommendations and Planned Interventions:  [x]                  Self Care Training                   [x]         Therapeutic Activities  [x]                  Functional Mobility Training    []          Cognitive Retraining  [x]                  Therapeutic Exercises            [x]          Endurance Activities  [x]                  Balance Training                     []          Neuromuscular Re-ed   []                  Visual/Perceptual Training      [x]       Home Safety Training  [x]                  Patient Education                    [x]          Family Training/Education  []                  Other (comment):    Frequency/Duration: Patient will be followed by occupational therapy 3-5 times a week to address goals.     Discharge Recommendations: short rehab stay    Further Equipment Recommendations for Discharge: bedside commode SUBJECTIVE:  Patient stated: \"I don't want to stand\"    OBJECTIVE/TREATMENT:     Past Medical History:   Diagnosis Date    Cancer ([facility]     Chronic obstructive pulmonary disease ([facility]     Diabetes ([facility]     Hypertension     Stroke ([facility]      Past Surgical History:   Procedure Laterality Date    BREAST SURGERY PROCEDURE UNLISTED      HX GYN          Eval Complexity: History: MEDIUM Complexity : Expanded review of history including physical, cognitive and psychosocial  history ; Examination: MEDIUM Complexity : 3-5 performance deficits relating to physical, cognitive , or psychosocial skils that result in activity limitations and / or participation restrictions; Decision Making:MEDIUM Complexity : Patient may present with comorbidities that affect occupational performnce. Miniml to moderate modification of tasks or assistance (eg, physical or verbal ) with assesment(s) is necessary to enable patient to complete evaluation     G CODES: Self Care  Current  CJ= 20-39%   Goal  CI= 1-19%. The severity rating is based on the Other Fucntional Assessment ,MMT, ROM    Prior Level of Function/Home Situation:   Ambulatory and capable of self-care but unable to carry out any work activities. Up and about more than 50% of waking hours.    Lives with Spouse  Walker    Cognitive/Behavioral Status:   Neurologic State: alert   Orientation: only aware of  place and person  Cognition:  following commands  follows multi-step simple commands/direction   Safety/Judgement: Awareness of environment and Fall prevention    ROM: within normal limits (BUEs)  MMT: BUEs approx 3+/5  Coordination: BUEs WFL  Hand dominance: Right    Skin: Intact (BUEs)  Edema: None noted (BUEs)  Sensation: Intact (BUEs)    Vision/Perceptual: normal      Functional Status      Indep   Mod I   Sup. /  Set- Up    SBA   CGA   Min Assist   Mod Assist   Max assist   Total Assist   Assist x2    Additional Time   NT   Comments   Rolling []  []  []  [x]  []    []    []    []  []  []  []  []     Supine to sit []  []  []  [x]  []  []  []  []  []  []  [x]  []     Sit to supine []  []  [x]  []  []  []  []  []  []  []  []  []     Sit to stand []  []  []  []  [x]  [x]  []  []  []  [x]  []  []     Toilet Transfer []  []  []  []  []  []  []  []  []  []  []  [x]                     Feeding []  []  [x]  []  []  []  []  []  []  []  []  []     Grooming []  []  [x]  []  []  []  []  []  []  [] []  []     Bathing  []  []  []  []  []  []  [x]  []  []  []  []  []     UB Dressing  []  []  [x]  []  []  []  []  []  []  []  []  []     LB Dressing  []  []  []  []  []  []  [x]  []  []  []  []  []     Toileting []  []  []  []  []  []  []  [x]  []  []  []  []  purewick       Balance    Good   Fair   Poor   Unable   Comments   Sitting static [x]  []  []  []     Sitting dynamic []  [x]  []  []  Fair+   Standing static []  [x]  []  []     Standing dynamic []  [x]  []  []  Fair/Fair-     Pain:   Pre treatment: 0/10  Post treatment: 0/10  Scale: numeric    Activity tolerance:  fair    COMMUNICATION/EDUCATION: Pt educated on role of OT and POC; she verbalized understanding but needs reinforcement. [x]         Fall prevention education was provided and the patient/caregiver indicated understanding. [x]         Patient/family have participated as able in goal setting and plan of care. [x]         Patient/family agree to work toward stated goals and plan of care. []         Patient understands intent and goals of therapy, but is neutral about his/her participation     The patients plan of care was also discussed with: Physical Therapist, Certified Occupational Therapy Assistant and Registered Nurse.      · After treatment position/precautions:   o Supine in bed  o Bed in low position  o Call light within reach  o RN notified     Recommendations for nursing: up with assist x1    Thank you for this referral.  Benoit Flores MS OTR/L  Time Calculation: 8 mins

## 2018-08-27 NOTE — PROGRESS NOTES
Hospitalist Progress Note      Daily Progress Note: 2018 9:22 AM        Interval history / Subjective:   Merlin Gonzalez a 70 y. o. female with history of dementia, COPD, Cancer, DM, HTN, CVA who presented to ED for acute on chronic AMS. EMS found patient incontinent of bowel and bladder and warm along with hyperglycemia with  and temp 101. Hx was limited 2/2 patient condition. In ED patient was found to have sepsis 2/2 UTI and admitted for treatment. She is on levaquin. Blood and Ucx pending. Pt more awake this morning saying she is fine. Current Facility-Administered Medications   Medication Dose Route Frequency    sodium chloride (NS) flush 5-10 mL  5-10 mL IntraVENous PRN    levoFLOXacin (LEVAQUIN) 750 mg in D5W IVPB  750 mg IntraVENous Q48H    cefepime (MAXIPIME) 2 g in 0.9% sodium chloride (MBP/ADV) 100 mL MBP  2 g IntraVENous Q12H    aspirin (ASPIRIN) tablet 325 mg  325 mg Oral DAILY    atorvastatin (LIPITOR) tablet 20 mg  20 mg Oral DAILY    gabapentin (NEURONTIN) capsule 600 mg  600 mg Oral TID    acetaminophen (TYLENOL) tablet 650 mg  650 mg Oral Q4H PRN    heparin (porcine) injection 5,000 Units  5,000 Units SubCUTAneous Q8H    glucose chewable tablet 16 g  4 Tab Oral PRN    glucagon (GLUCAGEN) injection 1 mg  1 mg IntraMUSCular PRN    dextrose (D50W) injection syrg 12.5-25 g  25-50 mL IntraVENous PRN    albuterol-ipratropium (DUO-NEB) 2.5 MG-0.5 MG/3 ML  3 mL Nebulization Q4H PRN    ondansetron (ZOFRAN) injection 4 mg  4 mg IntraVENous Q4H PRN    insulin lispro (HUMALOG) injection   SubCUTAneous AC&HS    dextrose 5% and 0.9% NaCl infusion  75 mL/hr IntraVENous CONTINUOUS        Review of Systems  Awake,feeling fine.     Objective:     Visit Vitals    /81    Pulse 79    Temp 98 °F (36.7 °C)    Resp 18    Ht 5' 2\" (1.575 m)    Wt 79.9 kg (176 lb 3.2 oz)    SpO2 91%    BMI 32.23 kg/m2      O2 Device: Room air    Temp (24hrs), Av °F (36.7 °C), Min:97.6 °F (36.4 °C), Max:98.2 °F (36.8 °C)         08/25 1901 - 08/27 0700  In: 2255 [P.O.:880; I.V.:1375]  Out: 26 [Urine:460]  Physical Exam   Constitutional: No distress. HEENT:at/nc,mouth moist.  Cardiovascular: Regular rhythm. S1S2+,no m/g  Pulmonary/Chest: Effort normal and breath sounds normal. No stridor. No respiratory distress. She has no rales. She exhibits no tenderness. Abdominal: Soft. Bowel sounds are normal. She exhibits no distension and no mass. There is no tenderness. There is no rebound. Musculoskeletal: She exhibits no edema. Neurological:Awake,alert,moving all extremities. Skin: Skin is warm. She is not diaphoretic. Data Review    Recent Results (from the past 12 hour(s))   METABOLIC PANEL, COMPREHENSIVE    Collection Time: 08/27/18  6:45 AM   Result Value Ref Range    Sodium 141 136 - 145 mmol/L    Potassium 3.4 (L) 3.5 - 5.5 mmol/L    Chloride 109 (H) 100 - 108 mmol/L    CO2 22 21 - 32 mmol/L    Anion gap 10 3.0 - 18 mmol/L    Glucose 215 (H) 74 - 99 mg/dL    BUN 10 7.0 - 18 MG/DL    Creatinine 0.66 0.6 - 1.3 MG/DL    BUN/Creatinine ratio 15 12 - 20      GFR est AA >60 >60 ml/min/1.73m2    GFR est non-AA >60 >60 ml/min/1.73m2    Calcium 7.4 (L) 8.5 - 10.1 MG/DL    Bilirubin, total 0.2 0.2 - 1.0 MG/DL    ALT (SGPT) 21 13 - 56 U/L    AST (SGOT) 24 15 - 37 U/L    Alk.  phosphatase 87 45 - 117 U/L    Protein, total 5.3 (L) 6.4 - 8.2 g/dL    Albumin 1.9 (L) 3.4 - 5.0 g/dL    Globulin 3.4 2.0 - 4.0 g/dL    A-G Ratio 0.6 (L) 0.8 - 1.7     CBC WITH AUTOMATED DIFF    Collection Time: 08/27/18  6:45 AM   Result Value Ref Range    WBC 2.4 (L) 4.6 - 13.2 K/uL    RBC 3.08 (L) 4.20 - 5.30 M/uL    HGB 9.4 (L) 12.0 - 16.0 g/dL    HCT 28.8 (L) 35.0 - 45.0 %    MCV 93.5 74.0 - 97.0 FL    MCH 30.5 24.0 - 34.0 PG    MCHC 32.6 31.0 - 37.0 g/dL    RDW 14.6 (H) 11.6 - 14.5 %    PLATELET 912 620 - 059 K/uL    MPV 10.0 9.2 - 11.8 FL    NEUTROPHILS 58 40 - 73 %    LYMPHOCYTES 31 21 - 52 %    MONOCYTES 7 3 - 10 % EOSINOPHILS 4 0 - 5 %    BASOPHILS 0 0 - 2 %    ABS. NEUTROPHILS 1.4 (L) 1.8 - 8.0 K/UL    ABS. LYMPHOCYTES 0.8 (L) 0.9 - 3.6 K/UL    ABS. MONOCYTES 0.2 0.05 - 1.2 K/UL    ABS. EOSINOPHILS 0.1 0.0 - 0.4 K/UL    ABS. BASOPHILS 0.0 0.0 - 0.1 K/UL    DF AUTOMATED     GLUCOSE, POC    Collection Time: 08/27/18  8:48 AM   Result Value Ref Range    Glucose (POC) 222 (H) 70 - 110 mg/dL         Assessment/Plan:     Principal Problem:    Sepsis ([facility] (5/25/2016)    Active Problems:    DM (diabetes mellitus) ([facility] (8/6/2018)      TIA (transient ischemic attack) (8/6/2018)      UTI (urinary tract infection) (8/25/2018)      Hyperglycemia due to type 2 diabetes mellitus ([facility] (8/25/2018)     Acute encephalopathy    FRANC. Hypokalemia       Care Plan  1. Sepsis 2/2 UTI -elevated temp,tachycardia,lactic acidosis  -abx  -blood cultures/Ucx - results pending  -IVF  -Repeat lactic acid. -WBC nl,afebrile today.  -On levaquin and cefepime  - blood culture NGTD  - Urine culture pending     Hyperglycemia with DM   -IVF D5 NS + KCL  -On corrective insulin  -Hypoglycemic this am on bmp 41 . Accu-check 142  -Monitor for hypoglycemia    Acute encephalopathy - metabolic on top of infection. -Mentation improved today as patient seems at her baseline. FRANC - likely due to dehydration  -Creat 1.24/GFR 42 on admission  -Improved with iv fluid. Creat 0.66/GFR >60 today     Hx of TIA  -asa, statin     Essential HTN  -hold anti-htn for today    Hypokalemia   - replace      DVT prophayxlis  -scd/teds  -heparin sq

## 2018-08-27 NOTE — PROGRESS NOTES
Problem: Falls - Risk of  Goal: *Absence of Falls  Document Efrain Fall Risk and appropriate interventions in the flowsheet.    Outcome: Progressing Towards Goal  Fall Risk Interventions:  Mobility Interventions: Communicate number of staff needed for ambulation/transfer, PT Consult for mobility concerns    Mentation Interventions: Adequate sleep, hydration, pain control, Door open when patient unattended, Room close to nurse's station    Medication Interventions: Bed/chair exit alarm, Patient to call before getting OOB    Elimination Interventions: Call light in reach, Patient to call for help with toileting needs

## 2018-08-28 LAB
ALBUMIN SERPL-MCNC: 2 G/DL (ref 3.4–5)
ALBUMIN/GLOB SERPL: 0.6 {RATIO} (ref 0.8–1.7)
ALP SERPL-CCNC: 95 U/L (ref 45–117)
ALT SERPL-CCNC: 19 U/L (ref 13–56)
ANION GAP SERPL CALC-SCNC: 7 MMOL/L (ref 3–18)
AST SERPL-CCNC: 15 U/L (ref 15–37)
BILIRUB SERPL-MCNC: 0.2 MG/DL (ref 0.2–1)
BUN SERPL-MCNC: 7 MG/DL (ref 7–18)
BUN/CREAT SERPL: 12 (ref 12–20)
CALCIUM SERPL-MCNC: 7.6 MG/DL (ref 8.5–10.1)
CHLORIDE SERPL-SCNC: 107 MMOL/L (ref 100–108)
CO2 SERPL-SCNC: 26 MMOL/L (ref 21–32)
CREAT SERPL-MCNC: 0.57 MG/DL (ref 0.6–1.3)
ERYTHROCYTE [DISTWIDTH] IN BLOOD BY AUTOMATED COUNT: 14.5 % (ref 11.6–14.5)
GLOBULIN SER CALC-MCNC: 3.5 G/DL (ref 2–4)
GLUCOSE BLD STRIP.AUTO-MCNC: 228 MG/DL (ref 70–110)
GLUCOSE BLD STRIP.AUTO-MCNC: 255 MG/DL (ref 70–110)
GLUCOSE BLD STRIP.AUTO-MCNC: 259 MG/DL (ref 70–110)
GLUCOSE BLD STRIP.AUTO-MCNC: 382 MG/DL (ref 70–110)
GLUCOSE SERPL-MCNC: 287 MG/DL (ref 74–99)
HCT VFR BLD AUTO: 29.6 % (ref 35–45)
HGB BLD-MCNC: 9.6 G/DL (ref 12–16)
MCH RBC QN AUTO: 30.4 PG (ref 24–34)
MCHC RBC AUTO-ENTMCNC: 32.4 G/DL (ref 31–37)
MCV RBC AUTO: 93.7 FL (ref 74–97)
PLATELET # BLD AUTO: 170 K/UL (ref 135–420)
PMV BLD AUTO: 10.7 FL (ref 9.2–11.8)
POTASSIUM SERPL-SCNC: 3.6 MMOL/L (ref 3.5–5.5)
PROT SERPL-MCNC: 5.5 G/DL (ref 6.4–8.2)
RBC # BLD AUTO: 3.16 M/UL (ref 4.2–5.3)
SODIUM SERPL-SCNC: 140 MMOL/L (ref 136–145)
WBC # BLD AUTO: 3.2 K/UL (ref 4.6–13.2)

## 2018-08-28 PROCEDURE — 65270000029 HC RM PRIVATE

## 2018-08-28 PROCEDURE — 36415 COLL VENOUS BLD VENIPUNCTURE: CPT | Performed by: NURSE PRACTITIONER

## 2018-08-28 PROCEDURE — 74011636637 HC RX REV CODE- 636/637: Performed by: FAMILY MEDICINE

## 2018-08-28 PROCEDURE — 80053 COMPREHEN METABOLIC PANEL: CPT | Performed by: NURSE PRACTITIONER

## 2018-08-28 PROCEDURE — 85027 COMPLETE CBC AUTOMATED: CPT | Performed by: NURSE PRACTITIONER

## 2018-08-28 PROCEDURE — 77030038269 HC DRN EXT URIN PURWCK BARD -A

## 2018-08-28 PROCEDURE — 74011000258 HC RX REV CODE- 258: Performed by: EMERGENCY MEDICINE

## 2018-08-28 PROCEDURE — 74011250636 HC RX REV CODE- 250/636: Performed by: EMERGENCY MEDICINE

## 2018-08-28 PROCEDURE — 74011250637 HC RX REV CODE- 250/637: Performed by: NURSE PRACTITIONER

## 2018-08-28 PROCEDURE — 74011636637 HC RX REV CODE- 636/637: Performed by: INTERNAL MEDICINE

## 2018-08-28 PROCEDURE — 74011250636 HC RX REV CODE- 250/636: Performed by: NURSE PRACTITIONER

## 2018-08-28 PROCEDURE — 82962 GLUCOSE BLOOD TEST: CPT

## 2018-08-28 PROCEDURE — 74011250636 HC RX REV CODE- 250/636: Performed by: FAMILY MEDICINE

## 2018-08-28 RX ORDER — INSULIN GLARGINE 100 [IU]/ML
7 INJECTION, SOLUTION SUBCUTANEOUS DAILY
Status: DISCONTINUED | OUTPATIENT
Start: 2018-08-28 | End: 2018-08-29

## 2018-08-28 RX ADMIN — ASPIRIN 325 MG ORAL TABLET 325 MG: 325 PILL ORAL at 08:52

## 2018-08-28 RX ADMIN — INSULIN LISPRO 9 UNITS: 100 INJECTION, SOLUTION INTRAVENOUS; SUBCUTANEOUS at 18:24

## 2018-08-28 RX ADMIN — CEFEPIME HYDROCHLORIDE 2 G: 2 INJECTION, POWDER, FOR SOLUTION INTRAVENOUS at 13:12

## 2018-08-28 RX ADMIN — INSULIN LISPRO 6 UNITS: 100 INJECTION, SOLUTION INTRAVENOUS; SUBCUTANEOUS at 22:45

## 2018-08-28 RX ADMIN — ATORVASTATIN CALCIUM 20 MG: 20 TABLET, FILM COATED ORAL at 08:52

## 2018-08-28 RX ADMIN — INSULIN LISPRO 3 UNITS: 100 INJECTION, SOLUTION INTRAVENOUS; SUBCUTANEOUS at 08:52

## 2018-08-28 RX ADMIN — HEPARIN SODIUM 5000 UNITS: 5000 INJECTION INTRAVENOUS; SUBCUTANEOUS at 22:45

## 2018-08-28 RX ADMIN — GABAPENTIN 600 MG: 300 CAPSULE ORAL at 08:52

## 2018-08-28 RX ADMIN — GABAPENTIN 600 MG: 300 CAPSULE ORAL at 18:23

## 2018-08-28 RX ADMIN — CEFEPIME HYDROCHLORIDE 2 G: 2 INJECTION, POWDER, FOR SOLUTION INTRAVENOUS at 00:06

## 2018-08-28 RX ADMIN — DEXTROSE MONOHYDRATE, SODIUM CHLORIDE, AND POTASSIUM CHLORIDE 75 ML/HR: 50; 4.5; 1.49 INJECTION, SOLUTION INTRAVENOUS at 02:29

## 2018-08-28 RX ADMIN — HEPARIN SODIUM 5000 UNITS: 5000 INJECTION INTRAVENOUS; SUBCUTANEOUS at 18:24

## 2018-08-28 RX ADMIN — INSULIN LISPRO 15 UNITS: 100 INJECTION, SOLUTION INTRAVENOUS; SUBCUTANEOUS at 13:12

## 2018-08-28 RX ADMIN — HEPARIN SODIUM 5000 UNITS: 5000 INJECTION INTRAVENOUS; SUBCUTANEOUS at 07:33

## 2018-08-28 RX ADMIN — GABAPENTIN 600 MG: 300 CAPSULE ORAL at 21:38

## 2018-08-28 RX ADMIN — INSULIN GLARGINE 7 UNITS: 100 INJECTION, SOLUTION SUBCUTANEOUS at 12:00

## 2018-08-28 NOTE — ROUTINE PROCESS
1913: Assumed care. Awake. Denies any pain or discomfort at this time. Call light within reach. 2233: Due meds given. . Coverage provided per protocol. HS snack given per patient request.    0006: Due med given. 0230: Sleeping    0541: No change from previous assessment. 0630: Slept good thru night. Needs attended. AM care given     7868: Bedside and Verbal shift change report given to Gretchen Bruno RN (oncoming nurse) by me (offgoing nurse). Report included the following information SBAR, Kardex, Intake/Output, MAR and Recent Results.

## 2018-08-28 NOTE — PROGRESS NOTES
1918  Received bedside verbal shift report from [address] [place] [address]. Pt lying in bed resting comfortably. Piv # 22 to left hand intact. Perwic in place to low wall suction with yellow urine noted. Call bell within reach, side rails up x 3, bed low and locked. No complaints offered, pt instructed to call for assistance. 2250  Pt incontinent of urine. Cleaned at this time new gown and bedpad placed. 0000 reassessment completed. 0400  Reassessment completed. 0600  perwic emptied for 1500 ml clear yellow urine. Bedside and Verbal shift change report given to [address] [place] [address],[address] (oncoming nurse) by Mitesh Gonzalez (offgoing nurse). Report included the following information SBAR, Kardex, Intake/Output, MAR, Recent Results and Cardiac Rhythm . Neno Kaiser

## 2018-08-28 NOTE — PROGRESS NOTES
Hospitalist Progress Note Daily Progress Note: 8/28/2018 9:22 AM 
 
   
Interval history / Subjective:  
Megan Washington a 70 y. o. female with history of dementia, COPD, Cancer, DM, HTN, CVA who presented to ED for acute on chronic AMS. EMS found patient incontinent of bowel and bladder and warm along with hyperglycemia with  and temp 101. Hx was limited 2/2 patient condition. In ED patient was found to have sepsis 2/2 UTI and admitted for treatment. She is on levaquin. Blood and Urine ctx NGTD. Pt more awake this morning saying she is fine. Patient now at baseline . Awaiting placement  working on it. Current Facility-Administered Medications Medication Dose Route Frequency  dextrose 5% - 0.45% NaCl with KCl 20 mEq/L infusion  75 mL/hr IntraVENous CONTINUOUS  
 sodium chloride (NS) flush 5-10 mL  5-10 mL IntraVENous PRN  
 levoFLOXacin (LEVAQUIN) 750 mg in D5W IVPB  750 mg IntraVENous Q48H  cefepime (MAXIPIME) 2 g in 0.9% sodium chloride (MBP/ADV) 100 mL MBP  2 g IntraVENous Q12H  aspirin (ASPIRIN) tablet 325 mg  325 mg Oral DAILY  atorvastatin (LIPITOR) tablet 20 mg  20 mg Oral DAILY  gabapentin (NEURONTIN) capsule 600 mg  600 mg Oral TID  acetaminophen (TYLENOL) tablet 650 mg  650 mg Oral Q4H PRN  
 heparin (porcine) injection 5,000 Units  5,000 Units SubCUTAneous Q8H  
 glucose chewable tablet 16 g  4 Tab Oral PRN  
 glucagon (GLUCAGEN) injection 1 mg  1 mg IntraMUSCular PRN  
 dextrose (D50W) injection syrg 12.5-25 g  25-50 mL IntraVENous PRN  
 albuterol-ipratropium (DUO-NEB) 2.5 MG-0.5 MG/3 ML  3 mL Nebulization Q4H PRN  
 ondansetron (ZOFRAN) injection 4 mg  4 mg IntraVENous Q4H PRN  
 insulin lispro (HUMALOG) injection   SubCUTAneous AC&HS Review of Systems Awake,feeling fine. Objective:  
 
Visit Vitals  /71  Pulse 86  Temp 98.4 °F (36.9 °C)  Resp 20  
 Ht 5' 2\" (1.575 m)  Wt 79.9 kg (176 lb 3.2 oz)  SpO2 98%  BMI 32.23 kg/m2 O2 Device: Room air Temp (24hrs), Av.2 °F (36.8 °C), Min:98.1 °F (36.7 °C), Max:98.4 °F (36.9 °C) 
 
 
  
 1901 -  0700 In: 2580 [P.O.:1680; I.V.:900] Out: 3950 [GFUIR:0289] Physical Exam  
Constitutional: No distress. HEENT:at/nc,mouth moist. 
Cardiovascular: Regular rhythm. S1S2+,no m/g Pulmonary/Chest: Effort normal and breath sounds normal. No stridor. No respiratory distress. She has no rales. She exhibits no tenderness. Abdominal: Soft. Bowel sounds are normal. She exhibits no distension and no mass. There is no tenderness. There is no rebound. Musculoskeletal: She exhibits no edema. Neurological:Awake,alert,moving all extremities. Skin: Skin is warm. She is not diaphoretic. Data Review Recent Results (from the past 12 hour(s)) METABOLIC PANEL, COMPREHENSIVE Collection Time: 18  4:40 AM  
Result Value Ref Range Sodium 140 136 - 145 mmol/L Potassium 3.6 3.5 - 5.5 mmol/L Chloride 107 100 - 108 mmol/L  
 CO2 26 21 - 32 mmol/L Anion gap 7 3.0 - 18 mmol/L Glucose 287 (H) 74 - 99 mg/dL BUN 7 7.0 - 18 MG/DL Creatinine 0.57 (L) 0.6 - 1.3 MG/DL  
 BUN/Creatinine ratio 12 12 - 20 GFR est AA >60 >60 ml/min/1.73m2 GFR est non-AA >60 >60 ml/min/1.73m2 Calcium 7.6 (L) 8.5 - 10.1 MG/DL Bilirubin, total 0.2 0.2 - 1.0 MG/DL  
 ALT (SGPT) 19 13 - 56 U/L  
 AST (SGOT) 15 15 - 37 U/L Alk. phosphatase 95 45 - 117 U/L Protein, total 5.5 (L) 6.4 - 8.2 g/dL Albumin 2.0 (L) 3.4 - 5.0 g/dL Globulin 3.5 2.0 - 4.0 g/dL A-G Ratio 0.6 (L) 0.8 - 1.7    
CBC W/O DIFF Collection Time: 18  4:40 AM  
Result Value Ref Range WBC 3.2 (L) 4.6 - 13.2 K/uL  
 RBC 3.16 (L) 4.20 - 5.30 M/uL HGB 9.6 (L) 12.0 - 16.0 g/dL HCT 29.6 (L) 35.0 - 45.0 % MCV 93.7 74.0 - 97.0 FL  
 MCH 30.4 24.0 - 34.0 PG  
 MCHC 32.4 31.0 - 37.0 g/dL  
 RDW 14.5 11.6 - 14.5 % PLATELET 007 360 - 023 K/uL  MPV 10.7 9.2 - 11.8 FL  
 GLUCOSE, POC Collection Time: 08/28/18  7:19 AM  
Result Value Ref Range Glucose (POC) 255 (H) 70 - 110 mg/dL Assessment/Plan:  
 
Principal Problem: 
  Sepsis ([email or web address] (5/25/2016) Active Problems: 
  DM (diabetes mellitus) ([email or web address] (8/6/2018) TIA (transient ischemic attack) (8/6/2018) UTI (urinary tract infection) (8/25/2018) Hyperglycemia due to type 2 diabetes mellitus ([email or web address] (8/25/2018) Acute encephalopathy FRANC. Hypokalemia Care Plan 1. Sepsis 2/2 UTI -elevated temp,tachycardia,lactic acidosis -abx 
-blood cultures/Ucx - NGTD 
-IVF 
-Repeat lactic acid. -WBC nl,afebrile today. 
-On levaquin and cefepime 
- blood culture NGTD 
- Urine culture No growth 1 day  
  
Hyperglycemia with DM  
-IVF D5 NS + KCL 
-On corrective insulin -Hypoglycemic this am on bmp 41 . Accu-check 142 
-Monitor for hypoglycemia Acute encephalopathy - metabolic on top of infection. -Mentation improved today as patient seems at her baseline. FRANC - likely due to dehydration 
-Creat 1.24/GFR 42 on admission 
-Improved with iv fluid. Creat 0.57/GFR >60 today 
  
Hx of TIA 
-asa, statin 
  
Essential HTN 
-hold anti-htn for today Hypokalemia  
- replace  
  
DVT prophylaxis Full code Disposition :  working on placement Rehab  likely 8/29

## 2018-08-28 NOTE — PROGRESS NOTES
1930: Bedside and Verbal shift change report given to Ede Husain RN (oncoming nurse) by Carlos Garcia RN (offgoing nurse). Report included the following information SBAR, Kardex, MAR and Recent Results.

## 2018-08-29 VITALS
TEMPERATURE: 98.4 F | HEART RATE: 83 BPM | WEIGHT: 176.2 LBS | RESPIRATION RATE: 18 BRPM | HEIGHT: 62 IN | BODY MASS INDEX: 32.42 KG/M2 | OXYGEN SATURATION: 94 % | SYSTOLIC BLOOD PRESSURE: 140 MMHG | DIASTOLIC BLOOD PRESSURE: 82 MMHG

## 2018-08-29 LAB
GLUCOSE BLD STRIP.AUTO-MCNC: 198 MG/DL (ref 70–110)
GLUCOSE BLD STRIP.AUTO-MCNC: 288 MG/DL (ref 70–110)

## 2018-08-29 PROCEDURE — 74011250636 HC RX REV CODE- 250/636: Performed by: EMERGENCY MEDICINE

## 2018-08-29 PROCEDURE — 74011250637 HC RX REV CODE- 250/637: Performed by: FAMILY MEDICINE

## 2018-08-29 PROCEDURE — 74011000258 HC RX REV CODE- 258: Performed by: EMERGENCY MEDICINE

## 2018-08-29 PROCEDURE — 97535 SELF CARE MNGMENT TRAINING: CPT

## 2018-08-29 PROCEDURE — 97530 THERAPEUTIC ACTIVITIES: CPT

## 2018-08-29 PROCEDURE — 74011636637 HC RX REV CODE- 636/637: Performed by: FAMILY MEDICINE

## 2018-08-29 PROCEDURE — 82962 GLUCOSE BLOOD TEST: CPT

## 2018-08-29 PROCEDURE — 74011250636 HC RX REV CODE- 250/636: Performed by: NURSE PRACTITIONER

## 2018-08-29 PROCEDURE — 74011250637 HC RX REV CODE- 250/637: Performed by: NURSE PRACTITIONER

## 2018-08-29 RX ORDER — INSULIN GLARGINE 100 [IU]/ML
12 INJECTION, SOLUTION SUBCUTANEOUS DAILY
Status: DISCONTINUED | OUTPATIENT
Start: 2018-08-30 | End: 2018-08-29 | Stop reason: HOSPADM

## 2018-08-29 RX ORDER — LEVOFLOXACIN 750 MG/1
750 TABLET ORAL EVERY 24 HOURS
Status: DISCONTINUED | OUTPATIENT
Start: 2018-08-29 | End: 2018-08-29 | Stop reason: HOSPADM

## 2018-08-29 RX ORDER — LEVOFLOXACIN 5 MG/ML
750 INJECTION, SOLUTION INTRAVENOUS EVERY 24 HOURS
Status: DISCONTINUED | OUTPATIENT
Start: 2018-08-29 | End: 2018-08-29

## 2018-08-29 RX ADMIN — CEFEPIME HYDROCHLORIDE 2 G: 2 INJECTION, POWDER, FOR SOLUTION INTRAVENOUS at 11:53

## 2018-08-29 RX ADMIN — GABAPENTIN 600 MG: 300 CAPSULE ORAL at 08:50

## 2018-08-29 RX ADMIN — INSULIN LISPRO 9 UNITS: 100 INJECTION, SOLUTION INTRAVENOUS; SUBCUTANEOUS at 13:11

## 2018-08-29 RX ADMIN — INSULIN GLARGINE 7 UNITS: 100 INJECTION, SOLUTION SUBCUTANEOUS at 08:50

## 2018-08-29 RX ADMIN — GABAPENTIN 600 MG: 300 CAPSULE ORAL at 15:58

## 2018-08-29 RX ADMIN — ATORVASTATIN CALCIUM 20 MG: 20 TABLET, FILM COATED ORAL at 08:49

## 2018-08-29 RX ADMIN — HEPARIN SODIUM 5000 UNITS: 5000 INJECTION INTRAVENOUS; SUBCUTANEOUS at 08:49

## 2018-08-29 RX ADMIN — INSULIN LISPRO 3 UNITS: 100 INJECTION, SOLUTION INTRAVENOUS; SUBCUTANEOUS at 08:49

## 2018-08-29 RX ADMIN — LEVOFLOXACIN 750 MG: 750 TABLET, FILM COATED ORAL at 11:53

## 2018-08-29 RX ADMIN — ASPIRIN 325 MG ORAL TABLET 325 MG: 325 PILL ORAL at 08:49

## 2018-08-29 RX ADMIN — CEFEPIME HYDROCHLORIDE 2 G: 2 INJECTION, POWDER, FOR SOLUTION INTRAVENOUS at 01:57

## 2018-08-29 RX ADMIN — HEPARIN SODIUM 5000 UNITS: 5000 INJECTION INTRAVENOUS; SUBCUTANEOUS at 15:58

## 2018-08-29 NOTE — PROGRESS NOTES
0730:  Bedside and Verbal shift change report given to Inez Perry RN (oncoming nurse) by Fior Chavez RN (offgoing nurse). Report included the following information SBAR, Kardex, MAR and Recent Results. 6271:  Administered morning medications, patient tolerated well. 1145:  Assisted patient up to chair for lunch. Patient was able to sit on side of bed, stand and ambulate with walker to chair. 1312:  Administered due medications, patient tolerated well. 1824:  Administered due medications, patient tolerated well. 
 
1930:  Bedside and Verbal shift change report given to Talisha Lange RN (oncoming nurse) by Inez Perry RN (offgoing nurse). Report included the following information SBAR, Kardex, MAR and Recent Results.

## 2018-08-29 NOTE — PROGRESS NOTES
Pt accepted to [address] [place] [address] pending [facility] authorization. Tiesha Ayala started in availity & all clinicals faxed. Will cont to follow. Ruthy Harrell RN,ext [phone].

## 2018-08-29 NOTE — ROUTINE PROCESS
0730 Bedside, Verbal and Written shift change report given to [facility],[address] (oncoming nurse) by Zack Abdul (offgoing nurse). Report included the following information SBAR and Kardex. Patient currently resting in bed, alert and oriented to all spheres, denies pain or shortness of breath, call bell in reach, 5 Ps and hourly rounding completed, bed locked in low position 1510 TRANSFER - OUT REPORT: 
 
Verbal report given to Carlton Eubanks LPN(name) on Mayte Knott  being transferred to [facility] and rehab(unit) for routine progression of care Report consisted of patients Situation, Background, Assessment and  
Recommendations(SBAR). Information from the following report(s) SBAR, Kardex and ED Summary was reviewed with the receiving nurse. Lines:  
Peripheral IV 08/28/18 Left Hand (Active) Site Assessment Clean, dry, & intact 8/29/2018  6:02 AM  
Phlebitis Assessment 0 8/29/2018  6:02 AM  
Infiltration Assessment 0 8/29/2018  6:02 AM  
Dressing Status Clean, dry, & intact 8/29/2018  6:02 AM  
Dressing Type Transparent 8/29/2018  6:02 AM  
Hub Color/Line Status Blue 8/29/2018  6:02 AM  
Action Taken Open ports on tubing capped 8/29/2018  6:02 AM  
Alcohol Cap Used Yes 8/29/2018  6:02 AM  
  
 
Opportunity for questions and clarification was provided.

## 2018-08-29 NOTE — PROGRESS NOTES
Problem: Mobility Impaired (Adult and Pediatric) Goal: *Acute Goals and Plan of Care (Insert Text) Physical Therapy Goals Initiated 8/27/2018 and to be accomplished within 7 day(s) 1. Patient will move from supine to sit and sit to supine  in bed with modified independence. 2.  Patient will transfer from bed to chair and chair to bed with modified independence using the least restrictive device. 3.  Patient will perform sit to stand with modified independence. 4.  Patient will ambulate with modified independence for 50 feet with the least restrictive device. Outcome: Progressing Towards Goal 
 
PHYSICAL THERAPY: Daily TREATMENT Note INPATIENT: Medicare: Hospital Day: 5 Patient: Duran Hammer (43 y.o. female)    Date: 8/29/2018 Primary Diagnosis: Sepsis ([address]  
 ,  ,  
Precautions: Skin WBAT Chart, physical therapy assessment, plan of care and goals were reviewed. PLOF: with RW 
 
ASSESSMENT: 
Pt demonstrating improved activity tolerance today with decreased need for assist with bed mobility and transfers. Pt instructed in LE exercises for strength and ROM to improve functional mobility; CGA for bed mobility and transfer sup>sit, pt performed sit<>stand CGA X4; took 4 steps with min a / HHA bed<>BSC with verbal cues for safety. Progression toward goals: 
      Improving appropriately and progressing toward goals PLAN: 
Patient continues to benefit from skilled intervention to address the above impairments. Continue treatment per established plan of care. EDUCATION:  
Education:  Patient was educated on the following topics: exercises Barriers to Learning/Limitations: yes;  cognitive Compensate with: visual, verbal, tactile, kinesthetic cues/model Discharge Recommendations:  Baldomero Harp Further Equipment Recommendations for Discharge:  N/A Factors which may impact discharge planning: none SUBJECTIVE:  
Patient stated OK.  OBJECTIVE DATA SUMMARY:  
Critical Behavior: 
Neurologic State: Alert Orientation Level: Oriented X4 Cognition: Follows commands Safety/Judgement: Fall prevention G CODE:Mobility [id number] Current  CL= 60-79%   Goal  CJ= 20-39%. The severity rating is based on the Other KUS[address] Standing Balance Scale 
0: Pt performs 25% or less of standing activity (Max assist) CN, 100% impaired. 1: Pt supports self with upper extremities but requires therapist assistance. Pt performs 25-50% of effort (Mod assist) CM, 80% to <100% impaired. 1+: Pt supports self with upper extremities but requires therapist assistance. Pt performs >50% effort. (Min assist). CL, 60% to <80% impaired. 2: Pt supports self independently with both upper extremities (walker, crutches, parallel bars). CL, 60% to <80% impaired. 2+: Pt support self independently with 1 upper extremity (cane, crutch, 1 parallel bar). CK, 40% to <60% impaired. 3: Pt stands without upper extremity support for up to 30 seconds. CK, 40% to <60% impaired. 3+: Pt stands without upper extremity support for 30 seconds or greater. CJ, 20% to <40% impaired. 4: Pt independently moves and returns center of gravity 1-2 inches in one plane. CJ, 20% to <40% impaired. 4+: Pt independently moves and returns center of gravity 1-2 inches in multiple planes. CI, 1% to <20% impaired. 5: Pt independently moves and returns center of gravity in all planes greater than 2 inches. CH, 0% impaired. Functional Mobility: 
 
 
Functional Status Indep (I) Mod I  
CGA Min A Mod A Max A Total A Assist x2 Verbal cues Additional time Not tested Comments Rolling []  []  [x] []    []    []  []  [] [x] [x] [] Supine to sit []  []  [x] []  []  []  []  [] [x] [x] [] Sit to supine []  []  [x] []  []  []  []  [] [x] [x] [] Sit to stand []  []  [x] []  []  []  []  [] [x] [x] [] Stand to sit []  []  [x] []  []  []  []  [] [x] [x] [] Bed to chair transfers- [facility] []  []  [] [x]  []  []  []  [] [x] [x] [] Balance Good Amy Austin Poor Unable Not tested Comments Sitting static [x]  []  []  []  [] Sitting dynamic [x]  []  []  []  []   
Standing static [x]  []  []  []  []   
Standing dynamic [x]  []  []  []  [] With HHA Mobility/Gait:  
Level of Assistance: Minimum assistance Assistive Device: HHA Distance Ambulated: 3 feet Left Lower Extremity: WBAT Right Lower Extremity: WBAT Base of Support: widened Speed/Sulema: slow Step Length: left shortened and right shortened Swing Pattern: [facility] Stance: time Gait Abnormalities: decreased step clearance and trunk sway increase Stairs:  
Level of Assistance: Unable at this time Therapeutic Exercises:  
 
 
 
EXERCISE Sets Reps Active Active Assist  
Passive Self ROM Comments Ankle Pumps 1 10  [x] [] [] [] Quad Sets/Glut Sets 1 10 [x] [] [] [] Hamstring Sets   [] [] [] [] Short Arc Quads   [] [] [] [] Heel Slides 1 10 [x] [] [] [] Straight Leg Raises   [] [] [] [] Hip Abd/Add   [] [] [] [] Long Arc Quads 1 10 [x] [] [] [] Seated Marching   [] [] [] []   
Standing Marching   [] [] [] []   
   [] [] [] []   
 
 
Vital Signs Temp: 98.4 °F (36.9 °C) Pulse (Heart Rate): 96    
BP: 144/88 Resp Rate: 20    
O2 Sat (%): 98 % Pain: 
Pre treatment pain level:0 Post treatment pain level:0 Pain Scale 1: Visual 
Pain Intensity 1: 0 Activity Tolerance:  
Good After treatment:  
 
Patient left in no apparent distress in bed Call bell left within reach Michael Victoria PTA Time Calculation: 29 mins

## 2018-08-29 NOTE — PROGRESS NOTES
Transportation at Discharge:  8/29/2018 Transport Company/Representative: [employer] / [employer] Phone number: [phone] Reference number: None Estimated pick-up time: 4:00P Destination: [employer] [facility] Method of Transport: stretcher/ BLS Insurance Info: [employer] Authorization: No auth required Made DC transportation arrangements at the request of Outcomes Manager Beth Mckinley, Care- ext [phone]

## 2018-08-29 NOTE — PROGRESS NOTES
Problem: Self Care Deficits Care Plan (Adult) Goal: *Acute Goals and Plan of Care (Insert Text) Occupational Therapy Goals Initiated 8/27/2018 within 7 day(s). 1.  Patient will perform grooming tasks while standing with supervision/set-up. 2.  Patient will perform lower body dressing with minimal assistance and min vc's for activity pacing. 3.  Patient will perform functional task in standing for 8 minutes with supervision for safety to increase activity tolerance for ADLs. 4.  Patient will perform toilet transfers with supervision. 5.  Patient will perform all aspects of toileting with supervision. 6.  Patient will participate in upper extremity therapeutic exercise/activities for 8 minutes with supervision to maintain BUE strength for functional transfers and ADLs. 7.  Patient will utilize energy conservation techniques during functional activities with minimal verbal cues. Outcome: Progressing Towards Goal 
Occupational Therapy TREATMENT Patient: Ottie Cooks (47 y.o. female) Date: 8/29/2018 Diagnosis: Sepsis ([facility] Sepsis ([facility] [address] Precautions: Skin Chart, occupational therapy assessment, plan of care, and goals were reviewed. PLOF: Pt states independent ASSESSMENT: 
Pt standing w/PTA upon entry, soiled w/BM. Pt requires Max Assist w/toileting ADL 2/2 body habitus and decrease dynamic standing balance. Pt requires moderate encouragement for functional transfer training to [facility] and demonstrates stand pivot transfer w/hand over hand assist for hand placement. Pt returns to EOB and perform ADL grooming tasks w/increase time and set-up. Decrease ROM RUE requires assist reaching back of head w/hair care. Pt w/flat affect throughout session. Declines OOB to chair. Returned to supine w/all items within reach. EDUCATION Pt educated on importance of UE Therex and OOB. Encouraged TherEx throughout the day and OOB for all meals Progression toward goals: []          Improving appropriately and progressing toward goals [x]          Improving slowly and progressing toward goals 
[]          Not making progress toward goals and plan of care will be adjusted PLAN: 
Patient continues to benefit from skilled intervention to address the above impairments. Continue treatment per established plan of care. Discharge Recommendations:  Baldomero Harp Further Equipment Recommendations for Discharge:  shower chair and rolling walker SUBJECTIVE:  
Patient stated I like to watch tv. OBJECTIVE DATA SUMMARY: 
  
 
Cognitive/Behavioral Status: 
Neurologic State: Alert Orientation Level: Oriented X4 Cognition: Follows commands Safety/Judgement: Fall prevention Functional Mobility and Transfers for ADLs: 
 Bed Mobility: 
Sit to Supine: Stand-by assistance Scooting: Contact guard assistance Transfers: 
Sit to Stand: Minimum assistance Toilet Transfer : Minimum assistance (EOB <--> BSC xfer w/SPT) Balance: 
Sitting: Intact Standing: Intact; With support ADL Intervention: 
Grooming Washing Face: Supervision/set-up Washing Hands: Supervision/set-up Brushing Teeth: Supervision/set-up Brushing/Combing Hair: Minimum assistance Upper Body Dressing Assistance Hospital Gown: Stand-by assistance (at bed level) Toileting Toileting Assistance: Maximum assistance Bladder Hygiene: Moderate assistance Bowel Hygiene: Maximum assistance Clothing Management: Maximum assistance Pain Pre Treatment:0 Post Treatment:0 Pain Scale 1: Visual 
Pain Intensity 1: 0 Activity Tolerance:   
Fair Please refer to the flowsheet for vital signs taken during this treatment. After treatment:  
[]  Patient left in no apparent distress sitting up in chair 
[x]  Patient left in no apparent distress in bed 
[x]  Call bell left within reach 
[]  Nursing notified 
[]  Caregiver present 
[]  Bed alarm activated Anatoly Roe Time Calculation: 24 mins

## 2018-08-29 NOTE — DISCHARGE INSTRUCTIONS
Patient armband removed and shredded    DISCHARGE SUMMARY from Nurse    PATIENT INSTRUCTIONS:    After general anesthesia or intravenous sedation, for 24 hours or while taking prescription Narcotics:  · Limit your activities  · Do not drive and operate hazardous machinery  · Do not make important personal or business decisions  · Do  not drink alcoholic beverages  · If you have not urinated within 8 hours after discharge, please contact your surgeon on call. Report the following to your surgeon:  · Excessive pain, swelling, redness or odor of or around the surgical area  · Temperature over 100.5  · Nausea and vomiting lasting longer than 4 hours or if unable to take medications  · Any signs of decreased circulation or nerve impairment to extremity: change in color, persistent  numbness, tingling, coldness or increase pain  · Any questions    What to do at Home:  Recommended activity: Activity as tolerated    If you experience any of the following symptoms chest pain or shortness of breath, please follow up with PCP. *  Please give a list of your current medications to your Primary Care Provider. *  Please update this list whenever your medications are discontinued, doses are      changed, or new medications (including over-the-counter products) are added. *  Please carry medication information at all times in case of emergency situations. These are general instructions for a healthy lifestyle:    No smoking/ No tobacco products/ Avoid exposure to second hand smoke  Surgeon General's Warning:  Quitting smoking now greatly reduces serious risk to your health.     Obesity, smoking, and sedentary lifestyle greatly increases your risk for illness    A healthy diet, regular physical exercise & weight monitoring are important for maintaining a healthy lifestyle    You may be retaining fluid if you have a history of heart failure or if you experience any of the following symptoms:  Weight gain of 3 pounds or more overnight or 5 pounds in a week, increased swelling in our hands or feet or shortness of breath while lying flat in bed. Please call your doctor as soon as you notice any of these symptoms; do not wait until your next office visit. Recognize signs and symptoms of STROKE:    F-face looks uneven    A-arms unable to move or move unevenly    S-speech slurred or non-existent    T-time-call 911 as soon as signs and symptoms begin-DO NOT go       Back to bed or wait to see if you get better-TIME IS BRAIN. Warning Signs of HEART ATTACK     Call 911 if you have these symptoms:   Chest discomfort. Most heart attacks involve discomfort in the center of the chest that lasts more than a few minutes, or that goes away and comes back. It can feel like uncomfortable pressure, squeezing, fullness, or pain.  Discomfort in other areas of the upper body. Symptoms can include pain or discomfort in one or both arms, the back, neck, jaw, or stomach.  Shortness of breath with or without chest discomfort.  Other signs may include breaking out in a cold sweat, nausea, or lightheadedness. Don't wait more than five minutes to call 911 - MINUTES MATTER! Fast action can save your life. Calling 911 is almost always the fastest way to get lifesaving treatment. Emergency Medical Services staff can begin treatment when they arrive -- up to an hour sooner than if someone gets to the hospital by car. The discharge information has been reviewed with the patient. The patient verbalized understanding. Discharge medications reviewed with the patient and appropriate educational materials and side effects teaching were provided.   ___________________________________________________________________________________________________________________________________

## 2018-08-29 NOTE — PROGRESS NOTES
Problem: Falls - Risk of 
Goal: *Absence of Falls Document Sarah Amador Fall Risk and appropriate interventions in the flowsheet. Fall Risk Interventions: 
Mobility Interventions: Bed/chair exit alarm, Patient to call before getting OOB Mentation Interventions: Adequate sleep, hydration, pain control, Door open when patient unattended, More frequent rounding, Reorient patient, Room close to nurse's station, Update white board Medication Interventions: Evaluate medications/consider consulting pharmacy, Patient to call before getting OOB, Teach patient to arise slowly Elimination Interventions: Call light in reach, Patient to call for help with toileting needs

## 2018-08-29 NOTE — DISCHARGE SUMMARY
DISCHARGE SUMMARY        PATIENT ID: Yue Ang  MRN: [id number]   YOB: 1940   AGE: 70 y.o. DATE OF ADMISSION: 8/25/2018 10:45 AM    DATE OF DISCHARGE: 8/29/2018  PRIMARY CARE PROVIDER: Nhung Vasquez MD     Procedure Performed:  none      DISCHARGING PHYSICIAN: Ramon Vera MD    Call hospitalist office [phone]. Discharge Diagnosis:   Patient Active Problem List    Diagnosis Date Noted    UTI (urinary tract infection) 08/25/2018    Hyperglycemia due to type 2 diabetes mellitus ([address] 08/25/2018    Speech abnormality 08/06/2018    Dysarthria 08/06/2018    Dog bite 08/06/2018    DM (diabetes mellitus) [address] [address] 08/06/2018    Hypoglycemia 08/06/2018    TIA (transient ischemic attack) 08/06/2018    Sepsis ([facility] [address] 05/25/2016    Encephalopathy 05/25/2016              CONSULTATIONS: IP CONSULT TO HOSPITALIST    History of Present Ilness:    Devon Beal a 70 y. o. female with history of dementia, COPD, Cancer, DM, HTN, CVA who presents to ED for acute on chronic AMS. EMS found patient incontinent of bowel and bladder and warm along with hyperglycemia with  and temp 101. Hx is limited 2/2 patient condition. In ED patient was found to have sepsis 2/2 UTI. She will be admitted for further eval and treatment.        Hospital Course:     Patient was admitted with sepsis UTI and was started on protocol with IV antibiotics cefepime and levaquin. Blood culture 8/25 x 2 days was negative , urine culture 6/26  x 1 day no growth. Patient is now at her baseline given hx dementia. She wants to go home however OT/ PT recommend SNF. Patient at time of discharge was stable. She has completed a 5 days course of Antibiotics.        Physical Exam on Discharge:  Visit Vitals    /73    Pulse 80    Temp 98.4 °F (36.9 °C)    Resp 20    Ht 5' 2\" (1.575 m)    Wt 79.9 kg (176 lb 3.2 oz)    SpO2 94%    BMI 32.23 kg/m2       General:  Alert, cooperative, no distress, appears stated age. Lungs:   Clear to auscultation bilaterally. Chest wall:  No tenderness or deformity. Heart:  Regular rate and rhythm, S1, S2 normal, no murmur, click, rub or gallop. Abdomen:   Soft, non-tender. Bowel sounds normal. No masses,  No organomegaly. Extremities: Extremities normal, atraumatic, no cyanosis or edema. Pulses: 2+ and symmetric all extremities. Skin: Skin color, texture, turgor normal. No rashes or lesions   Neurologic: CNII-XII intact. Pertinent Results:    Recent Labs      08/28/18   0440   BUN  7   NA  140   CO2  26     Recent Labs      08/28/18   0440   WBC  3.2*   RBC  3.16*   HCT  29.6*   MCV  93.7   MCH  30.4   MCHC  32.4   RDW  14.5     All Micro Results     Procedure Component Value Units Date/Time    CULTURE, BLOOD [[id number]] Collected:  08/25/18 1036    Order Status:  Completed Specimen:  Blood from Blood Updated:  08/29/18 0740     Special Requests: PERIPHERAL        Culture result: NO GROWTH 4 DAYS       CULTURE, BLOOD [[id number]] Collected:  08/25/18 1100    Order Status:  Completed Specimen:  Blood from Blood Updated:  08/29/18 0740     Special Requests: PERIPHERAL        Culture result: NO GROWTH 4 DAYS       CULTURE, URINE [[id number]] Collected:  08/26/18 1042    Order Status:  Completed Specimen:  Urine from Urine Updated:  08/27/18 1040     Special Requests: NO SPECIAL REQUESTS        Culture result: NO GROWTH 1 DAY             CT Results  (Last 48 hours)    None          Indication:  Altered mental status, fever     Comparison:  08/06/18     Time of study - 1035     Findings:  AP erect portable chest     The cardiomediastinal silhouette is normal.  The lungs are clear. Multiple  surgical clips are evident in the left axilla.   Scoliosis and spondylosis is  present.     IMPRESSION  IMPRESSION:      No active     DISCHARGE MEDICATIONS:   Current Discharge Medication List      CONTINUE these medications which have NOT CHANGED    Details   losartan (COZAAR) 100 mg tablet Take 100 mg by mouth daily. aspirin (ASPIRIN) 325 mg tablet Take 325 mg by mouth daily. Indications: MYOCARDIAL INFARCTION PREVENTION, PREVENTION OF TRANSIENT ISCHEMIC ATTACKS      atorvastatin (LIPITOR) 20 mg tablet Take 20 mg by mouth daily. Indications: HYPERLIPIDEMIA, PREVENTION OF CEREBROVASCULAR ACCIDENT      hydrochlorothiazide (HYDRODIURIL) 25 mg tablet Take 25 mg by mouth daily. Indications: EDEMA      HYDROcodone-acetaminophen (NORCO) 7.5-325 mg per tablet Take 1 Tab by mouth every twelve (12) hours as needed for Pain. Indications: PAIN      metFORMIN (GLUCOPHAGE) 500 mg tablet Take 500 mg by mouth two (2) times daily (with meals). Indications: TYPE 2 DIABETES MELLITUS      ipratropium (ATROVENT) 0.02 % nebulizer solution 0.5 mg by Nebulization route every six (6) hours as needed for Wheezing. albuterol (PROVENTIL VENTOLIN) 2.5 mg /3 mL (0.083 %) nebulizer solution 2.5 mg by Nebulization route every six (6) hours as needed for Wheezing. gabapentin (NEURONTIN) 300 mg capsule Take 600 mg by mouth three (3) times daily. Indications: feet pain      albuterol (PROVENTIL HFA, VENTOLIN HFA, PROAIR HFA) 90 mcg/actuation inhaler Take 2 Puffs by inhalation every four (4) hours as needed for Wheezing. Indications: SOB      glimepiride (AMARYL) 4 mg tablet Take 4 mg by mouth ACB/HS. traZODone (DESYREL) 300 mg tablet Take 300 mg by mouth nightly.          STOP taking these medications       amoxicillin-clavulanate (AUGMENTIN) 1,000-62.5 mg per tablet Comments:   Reason for Stopping:         fluconazole (DIFLUCAN) 200 mg tablet Comments:   Reason for Stopping:               Condition at discharge:  Afebrile  Eating, Drinking, Voiding  Improved  Stable    FOLLOW UP APPOINTMENTS:    Follow-up Information     Follow up With Details Comments MD Devante   [address] [facility]  [phone]             Disposition:  Rehab       Total discharge preparation time was > 30  minutes.

## 2018-08-29 NOTE — PROGRESS NOTES
Late entry for 8/28/18:  Noted therapy rec for SNF. Spoke with pt's spouse, made him aware of accepting SNF's & he has chosen [facility]. Made him aware that it [place] [facility] [place], verbalized understanding. Sherman Bui, ext [phone]

## 2018-08-29 NOTE — PROGRESS NOTES
Pt accepted to [address] authorization has been obtained. [facility] transport set up for 1600. Met with pt & made her aware of above. Called pt's spouse & made him aware of above, agreeable to transfer. Pt's nurse made aware of above. Ruthy Harrell,RN,ext [phone]. Care Management Interventions PCP Verified by CM: Yes (last seen in less than a year she says.) Palliative Care Criteria Met (RRAT>21 & CHF Dx)?: No 
Mode of Transport at Discharge: S Transition of Care Consult (CM Consult): Discharge Planning MyChart Signup: No 
Discharge Durable Medical Equipment: No 
Physical Therapy Consult: Yes Occupational Therapy Consult: Yes Speech Therapy Consult: No 
Current Support Network: Lives with Spouse Confirm Follow Up Transport: Family Plan discussed with Pt/Family/Caregiver: Yes [employer] Information Provided?: No 
Discharge Location Discharge Placement: Skilled nursing facility ( [facility])

## 2018-08-29 NOTE — ANCILLARY DISCHARGE INSTRUCTIONS
Patient and/or next of kin has been given the [facility] Important Message From Medicare About Your Rights\" letter and all questions were answered. Patient unable to sign, copy given.

## 2018-09-06 ENCOUNTER — PATIENT OUTREACH (OUTPATIENT)
Dept: CASE MANAGEMENT | Age: 78
End: 2018-09-06

## 2018-09-06 NOTE — PROGRESS NOTES
Community Care Team Documentation for Patient in [facility]     Patient discharged from [facility] 2018 - 2018 to [facility], [address], on 2018. Hospital Discharge diagnosis:  sepsis. SNF Attending Provider:      Anticipated discharge date from SNF:        PCP : David Garrison MD    Nurse Navigator:     [facility] Team rounds completed, updates provided by facility. Patient coded and  at facility 2018. Will close.

## 2020-05-29 NOTE — CDMP QUERY
Please clarify if this patient is being treated/managed for:    =>Cellulitis right hand  related to recent dog bite, improving with treatment  =>Other Explanation of clinical findings  =>Unable to Determine (no explanation of clinical findings)    The medical record reflects the following:    Risk: Hx dog bite     Clinical Indicators:ED notes- Patient was recently placed on abx after dog bite by PCP. Her  did say that she was bit by their dog on her right hand, the area is noted red and inflamed. \"Dorsal right hand is erythematous and old healing bite wound evident. Treatment: IV Zosyn => changed to PO Augmentin 8/7    Please clarify and document your clinical opinion in the progress notes and discharge summary.     Thank you,  Olinda Howell RN,, CCDS  [phone] Problem: Falls - Risk of:  Goal: Will remain free from falls  Description: Will remain free from falls  5/29/2020 0717 by Isabella Alvarado RN  Outcome: Met This Shift  5/29/2020 0217 by Josh Isabel RN  Outcome: Met This Shift  Goal: Absence of physical injury  Description: Absence of physical injury  5/29/2020 0717 by Isabella Alvarado RN  Outcome: Met This Shift  5/29/2020 0217 by Josh Isabel RN  Outcome: Met This Shift
